# Patient Record
Sex: FEMALE | Race: WHITE | NOT HISPANIC OR LATINO | Employment: OTHER | URBAN - METROPOLITAN AREA
[De-identification: names, ages, dates, MRNs, and addresses within clinical notes are randomized per-mention and may not be internally consistent; named-entity substitution may affect disease eponyms.]

---

## 2017-12-01 ENCOUNTER — CONVERSION ENCOUNTER (OUTPATIENT)
Dept: MAMMOGRAPHY | Facility: HOSPITAL | Age: 67
End: 2017-12-01

## 2018-02-13 ENCOUNTER — OFFICE VISIT CONVERTED (OUTPATIENT)
Dept: CARDIOLOGY | Facility: CLINIC | Age: 68
End: 2018-02-13
Attending: SPECIALIST

## 2018-06-19 ENCOUNTER — OFFICE VISIT CONVERTED (OUTPATIENT)
Dept: FAMILY MEDICINE CLINIC | Facility: CLINIC | Age: 68
End: 2018-06-19
Attending: NURSE PRACTITIONER

## 2018-06-25 ENCOUNTER — OFFICE VISIT CONVERTED (OUTPATIENT)
Dept: SURGERY | Facility: CLINIC | Age: 68
End: 2018-06-25
Attending: SURGERY

## 2018-07-20 ENCOUNTER — OFFICE VISIT CONVERTED (OUTPATIENT)
Dept: SURGERY | Facility: CLINIC | Age: 68
End: 2018-07-20
Attending: SURGERY

## 2018-08-14 ENCOUNTER — OFFICE VISIT CONVERTED (OUTPATIENT)
Dept: CARDIOLOGY | Facility: CLINIC | Age: 68
End: 2018-08-14
Attending: SPECIALIST

## 2018-08-24 ENCOUNTER — CONVERSION ENCOUNTER (OUTPATIENT)
Dept: FAMILY MEDICINE CLINIC | Facility: CLINIC | Age: 68
End: 2018-08-24

## 2018-08-24 ENCOUNTER — OFFICE VISIT CONVERTED (OUTPATIENT)
Dept: FAMILY MEDICINE CLINIC | Facility: CLINIC | Age: 68
End: 2018-08-24
Attending: NURSE PRACTITIONER

## 2018-10-31 ENCOUNTER — CONVERSION ENCOUNTER (OUTPATIENT)
Dept: FAMILY MEDICINE CLINIC | Facility: CLINIC | Age: 68
End: 2018-10-31

## 2018-10-31 ENCOUNTER — OFFICE VISIT CONVERTED (OUTPATIENT)
Dept: FAMILY MEDICINE CLINIC | Facility: CLINIC | Age: 68
End: 2018-10-31
Attending: NURSE PRACTITIONER

## 2018-12-03 ENCOUNTER — CONVERSION ENCOUNTER (OUTPATIENT)
Dept: MAMMOGRAPHY | Facility: HOSPITAL | Age: 68
End: 2018-12-03

## 2018-12-14 ENCOUNTER — OFFICE VISIT CONVERTED (OUTPATIENT)
Dept: SURGERY | Facility: CLINIC | Age: 68
End: 2018-12-14
Attending: SURGERY

## 2019-01-23 ENCOUNTER — HOSPITAL ENCOUNTER (OUTPATIENT)
Dept: PERIOP | Facility: HOSPITAL | Age: 69
Setting detail: HOSPITAL OUTPATIENT SURGERY
Discharge: HOME | End: 2019-01-23
Attending: SURGERY

## 2019-01-23 LAB
ANION GAP SERPL CALC-SCNC: 14 MMOL/L (ref 8–19)
BUN SERPL-MCNC: 12 MG/DL (ref 5–25)
BUN/CREAT SERPL: 11 {RATIO} (ref 6–20)
CALCIUM SERPL-MCNC: 9.4 MG/DL (ref 8.7–10.4)
CHLORIDE SERPL-SCNC: 104 MMOL/L (ref 99–111)
CONV CO2: 27 MMOL/L (ref 22–32)
CREAT UR-MCNC: 1.12 MG/DL (ref 0.5–0.9)
GFR SERPLBLD BASED ON 1.73 SQ M-ARVRAT: 51 ML/MIN/{1.73_M2}
GLUCOSE BLD-MCNC: 88 MG/DL (ref 65–99)
GLUCOSE SERPL-MCNC: 95 MG/DL (ref 65–99)
OSMOLALITY SERPL CALC.SUM OF ELEC: 292 MOSM/KG (ref 273–304)
POTASSIUM SERPL-SCNC: 4.2 MMOL/L (ref 3.5–5.3)
SODIUM SERPL-SCNC: 141 MMOL/L (ref 135–147)

## 2019-01-31 ENCOUNTER — HOSPITAL ENCOUNTER (OUTPATIENT)
Dept: GENERAL RADIOLOGY | Facility: HOSPITAL | Age: 69
Discharge: HOME | End: 2019-01-31
Attending: NURSE PRACTITIONER

## 2019-01-31 ENCOUNTER — OFFICE VISIT CONVERTED (OUTPATIENT)
Dept: FAMILY MEDICINE CLINIC | Facility: CLINIC | Age: 69
End: 2019-01-31
Attending: NURSE PRACTITIONER

## 2019-01-31 LAB
ALBUMIN SERPL-MCNC: 4 G/DL (ref 3.5–5)
ALBUMIN/GLOB SERPL: 1.2 {RATIO} (ref 1.4–2.6)
ALP SERPL-CCNC: 173 U/L (ref 43–160)
ALT SERPL-CCNC: 8 U/L (ref 10–40)
ANION GAP SERPL CALC-SCNC: 15 MMOL/L (ref 8–19)
APPEARANCE UR: CLEAR
AST SERPL-CCNC: 11 U/L (ref 15–50)
BASOPHILS # BLD AUTO: 0.02 10*3/UL (ref 0–0.2)
BASOPHILS NFR BLD AUTO: 0.22 % (ref 0–3)
BILIRUB SERPL-MCNC: 0.4 MG/DL (ref 0.2–1.3)
BILIRUB UR QL: NEGATIVE
BUN SERPL-MCNC: 12 MG/DL (ref 5–25)
BUN/CREAT SERPL: 13 {RATIO} (ref 6–20)
CALCIUM SERPL-MCNC: 10 MG/DL (ref 8.7–10.4)
CHLORIDE SERPL-SCNC: 102 MMOL/L (ref 99–111)
CHOLEST SERPL-MCNC: 158 MG/DL (ref 107–200)
CHOLEST/HDLC SERPL: 3.8 {RATIO} (ref 3–6)
COLOR UR: YELLOW
CONV BACTERIA: NEGATIVE
CONV CO2: 26 MMOL/L (ref 22–32)
CONV COLLECTION SOURCE (UA): ABNORMAL
CONV CREATININE URINE, RANDOM: 88.6 MG/DL (ref 10–300)
CONV MICROALBUM.,U,RANDOM: <12 MG/L (ref 0–20)
CONV TOTAL PROTEIN: 7.3 G/DL (ref 6.3–8.2)
CONV UROBILINOGEN IN URINE BY AUTOMATED TEST STRIP: 1 {EHRLICHU}/DL (ref 0.1–1)
CREAT UR-MCNC: 0.93 MG/DL (ref 0.5–0.9)
EOSINOPHIL # BLD AUTO: 0.24 10*3/UL (ref 0–0.7)
EOSINOPHIL # BLD AUTO: 2.65 % (ref 0–7)
ERYTHROCYTE [DISTWIDTH] IN BLOOD BY AUTOMATED COUNT: 11.6 % (ref 11.5–14.5)
EST. AVERAGE GLUCOSE BLD GHB EST-MCNC: 123 MG/DL
GFR SERPLBLD BASED ON 1.73 SQ M-ARVRAT: >60 ML/MIN/{1.73_M2}
GLOBULIN UR ELPH-MCNC: 3.3 G/DL (ref 2–3.5)
GLUCOSE SERPL-MCNC: 92 MG/DL (ref 65–99)
GLUCOSE UR QL: NEGATIVE MG/DL
HBA1C MFR BLD: 13.5 G/DL (ref 12–16)
HBA1C MFR BLD: 5.9 % (ref 3.5–5.7)
HCT VFR BLD AUTO: 40.1 % (ref 37–47)
HDLC SERPL-MCNC: 42 MG/DL (ref 40–60)
HGB UR QL STRIP: ABNORMAL
KETONES UR QL STRIP: NEGATIVE MG/DL
LDLC SERPL CALC-MCNC: 72 MG/DL (ref 70–100)
LEUKOCYTE ESTERASE UR QL STRIP: ABNORMAL
LYMPHOCYTES # BLD AUTO: 1.63 10*3/UL (ref 1–5)
MCH RBC QN AUTO: 29 PG (ref 27–31)
MCHC RBC AUTO-ENTMCNC: 33.7 G/DL (ref 33–37)
MCV RBC AUTO: 86 FL (ref 81–99)
MICROALBUMIN/CREAT UR: 13.5 MG/G{CRE} (ref 0–35)
MONOCYTES # BLD AUTO: 0.82 10*3/UL (ref 0.2–1.2)
MONOCYTES NFR BLD AUTO: 9.13 % (ref 3–10)
NEUTROPHILS # BLD AUTO: 6.24 10*3/UL (ref 2–8)
NEUTROPHILS NFR BLD AUTO: 69.8 % (ref 30–85)
NITRITE UR QL STRIP: NEGATIVE
NRBC BLD AUTO-RTO: 0 % (ref 0–0.01)
OSMOLALITY SERPL CALC.SUM OF ELEC: 285 MOSM/KG (ref 273–304)
PH UR STRIP.AUTO: 5.5 [PH] (ref 5–8)
PLATELET # BLD AUTO: 266 10*3/UL (ref 130–400)
PMV BLD AUTO: 6.7 FL (ref 7.4–10.4)
POTASSIUM SERPL-SCNC: 4.8 MMOL/L (ref 3.5–5.3)
PROT UR QL: NEGATIVE MG/DL
RBC # BLD AUTO: 4.66 10*6/UL (ref 4.2–5.4)
RBC #/AREA URNS HPF: ABNORMAL /[HPF]
SODIUM SERPL-SCNC: 138 MMOL/L (ref 135–147)
SP GR UR: 1.01 (ref 1–1.03)
T4 FREE SERPL-MCNC: 1.4 NG/DL (ref 0.9–1.8)
TRIGL SERPL-MCNC: 218 MG/DL (ref 40–150)
TSH SERPL-ACNC: 1.21 M[IU]/L (ref 0.27–4.2)
VARIANT LYMPHS NFR BLD MANUAL: 18.2 % (ref 20–45)
VLDLC SERPL-MCNC: 44 MG/DL (ref 5–37)
WBC # BLD AUTO: 8.94 10*3/UL (ref 4.8–10.8)
WBC #/AREA URNS HPF: ABNORMAL /[HPF]

## 2019-02-01 LAB — HCV AB SER DONR QL: <0.1 S/CO RATIO (ref 0–0.9)

## 2019-02-03 LAB
AMOXICILLIN+CLAV SUSC ISLT: <=2
AMPICILLIN SUSC ISLT: <=2
AMPICILLIN+SULBAC SUSC ISLT: <=2
BACTERIA UR CULT: ABNORMAL
CEFAZOLIN SUSC ISLT: <=4
CEFEPIME SUSC ISLT: <=1
CEFTAZIDIME SUSC ISLT: <=1
CEFTRIAXONE SUSC ISLT: <=1
CEFUROXIME ORAL SUSC ISLT: 4
CEFUROXIME PARENTER SUSC ISLT: 4
CIPROFLOXACIN SUSC ISLT: <=0.25
ERTAPENEM SUSC ISLT: <=0.5
GENTAMICIN SUSC ISLT: <=1
LEVOFLOXACIN SUSC ISLT: <=0.12
NITROFURANTOIN SUSC ISLT: <=16
TETRACYCLINE SUSC ISLT: <=1
TMP SMX SUSC ISLT: <=20
TOBRAMYCIN SUSC ISLT: <=1

## 2019-02-05 ENCOUNTER — OFFICE VISIT CONVERTED (OUTPATIENT)
Dept: SURGERY | Facility: CLINIC | Age: 69
End: 2019-02-05
Attending: SURGERY

## 2019-02-26 ENCOUNTER — CONVERSION ENCOUNTER (OUTPATIENT)
Dept: FAMILY MEDICINE CLINIC | Facility: CLINIC | Age: 69
End: 2019-02-26

## 2019-02-26 ENCOUNTER — OFFICE VISIT CONVERTED (OUTPATIENT)
Dept: FAMILY MEDICINE CLINIC | Facility: CLINIC | Age: 69
End: 2019-02-26
Attending: NURSE PRACTITIONER

## 2019-03-19 ENCOUNTER — OFFICE VISIT CONVERTED (OUTPATIENT)
Dept: CARDIOLOGY | Facility: CLINIC | Age: 69
End: 2019-03-19
Attending: SPECIALIST

## 2019-03-19 ENCOUNTER — CONVERSION ENCOUNTER (OUTPATIENT)
Dept: CARDIOLOGY | Facility: CLINIC | Age: 69
End: 2019-03-19

## 2019-03-21 ENCOUNTER — HOSPITAL ENCOUNTER (OUTPATIENT)
Dept: CARDIOLOGY | Facility: HOSPITAL | Age: 69
Discharge: HOME | End: 2019-03-21
Attending: NURSE PRACTITIONER

## 2019-03-27 ENCOUNTER — OFFICE VISIT CONVERTED (OUTPATIENT)
Dept: PODIATRY | Facility: CLINIC | Age: 69
End: 2019-03-27
Attending: PODIATRIST

## 2019-05-10 ENCOUNTER — OFFICE VISIT CONVERTED (OUTPATIENT)
Dept: ORTHOPEDIC SURGERY | Facility: CLINIC | Age: 69
End: 2019-05-10
Attending: ORTHOPAEDIC SURGERY

## 2019-06-06 ENCOUNTER — OFFICE VISIT CONVERTED (OUTPATIENT)
Dept: ORTHOPEDIC SURGERY | Facility: CLINIC | Age: 69
End: 2019-06-06
Attending: ORTHOPAEDIC SURGERY

## 2019-06-18 ENCOUNTER — HOSPITAL ENCOUNTER (OUTPATIENT)
Dept: FAMILY MEDICINE CLINIC | Facility: CLINIC | Age: 69
Discharge: HOME | End: 2019-06-18
Attending: NURSE PRACTITIONER

## 2019-06-18 ENCOUNTER — HOSPITAL ENCOUNTER (OUTPATIENT)
Dept: GENERAL RADIOLOGY | Facility: HOSPITAL | Age: 69
Discharge: HOME | End: 2019-06-18
Attending: NURSE PRACTITIONER

## 2019-06-18 ENCOUNTER — OFFICE VISIT CONVERTED (OUTPATIENT)
Dept: FAMILY MEDICINE CLINIC | Facility: CLINIC | Age: 69
End: 2019-06-18
Attending: NURSE PRACTITIONER

## 2019-06-18 LAB
ALBUMIN SERPL-MCNC: 4.3 G/DL (ref 3.5–5)
ALBUMIN/GLOB SERPL: 1.4 {RATIO} (ref 1.4–2.6)
ALP SERPL-CCNC: 129 U/L (ref 43–160)
ALT SERPL-CCNC: 14 U/L (ref 10–40)
ANION GAP SERPL CALC-SCNC: 16 MMOL/L (ref 8–19)
AST SERPL-CCNC: 16 U/L (ref 15–50)
BASOPHILS # BLD AUTO: 0.05 10*3/UL (ref 0–0.2)
BASOPHILS NFR BLD AUTO: 0.5 % (ref 0–3)
BILIRUB SERPL-MCNC: 0.45 MG/DL (ref 0.2–1.3)
BUN SERPL-MCNC: 13 MG/DL (ref 5–25)
BUN/CREAT SERPL: 13 {RATIO} (ref 6–20)
CALCIUM SERPL-MCNC: 9.4 MG/DL (ref 8.7–10.4)
CHLORIDE SERPL-SCNC: 102 MMOL/L (ref 99–111)
CONV ABS IMM GRAN: 0.05 10*3/UL (ref 0–0.2)
CONV CO2: 25 MMOL/L (ref 22–32)
CONV IMMATURE GRAN: 0.5 % (ref 0–1.8)
CONV TOTAL PROTEIN: 7.3 G/DL (ref 6.3–8.2)
CREAT UR-MCNC: 0.99 MG/DL (ref 0.5–0.9)
DEPRECATED RDW RBC AUTO: 42.3 FL (ref 36.4–46.3)
EOSINOPHIL # BLD AUTO: 0.07 10*3/UL (ref 0–0.7)
EOSINOPHIL # BLD AUTO: 0.7 % (ref 0–7)
ERYTHROCYTE [DISTWIDTH] IN BLOOD BY AUTOMATED COUNT: 12.9 % (ref 11.7–14.4)
FOLATE SERPL-MCNC: 15.2 NG/ML (ref 4.8–20)
GFR SERPLBLD BASED ON 1.73 SQ M-ARVRAT: 59 ML/MIN/{1.73_M2}
GLOBULIN UR ELPH-MCNC: 3 G/DL (ref 2–3.5)
GLUCOSE SERPL-MCNC: 100 MG/DL (ref 65–99)
HBA1C MFR BLD: 13.8 G/DL (ref 12–16)
HCT VFR BLD AUTO: 43.7 % (ref 37–47)
LYMPHOCYTES # BLD AUTO: 1.54 10*3/UL (ref 1–5)
MCH RBC QN AUTO: 28.5 PG (ref 27–31)
MCHC RBC AUTO-ENTMCNC: 31.6 G/DL (ref 33–37)
MCV RBC AUTO: 90.3 FL (ref 81–99)
MONOCYTES # BLD AUTO: 0.88 10*3/UL (ref 0.2–1.2)
MONOCYTES NFR BLD AUTO: 8.4 % (ref 3–10)
NEUTROPHILS # BLD AUTO: 7.84 10*3/UL (ref 2–8)
NEUTROPHILS NFR BLD AUTO: 75.1 % (ref 30–85)
NRBC CBCN: 0 % (ref 0–0.7)
OSMOLALITY SERPL CALC.SUM OF ELEC: 288 MOSM/KG (ref 273–304)
PLATELET # BLD AUTO: 258 10*3/UL (ref 130–400)
PMV BLD AUTO: 10.2 FL (ref 9.4–12.3)
POTASSIUM SERPL-SCNC: 4.3 MMOL/L (ref 3.5–5.3)
RBC # BLD AUTO: 4.84 10*6/UL (ref 4.2–5.4)
SODIUM SERPL-SCNC: 139 MMOL/L (ref 135–147)
T4 FREE SERPL-MCNC: 1.5 NG/DL (ref 0.9–1.8)
TSH SERPL-ACNC: 1.03 M[IU]/L (ref 0.27–4.2)
VARIANT LYMPHS NFR BLD MANUAL: 14.8 % (ref 20–45)
VIT B12 SERPL-MCNC: 326 PG/ML (ref 211–911)
WBC # BLD AUTO: 10.43 10*3/UL (ref 4.8–10.8)

## 2019-06-20 LAB — B BURGDOR IGG+IGM SER-ACNC: <0.91 ISR (ref 0–0.9)

## 2019-07-18 ENCOUNTER — OFFICE VISIT CONVERTED (OUTPATIENT)
Dept: ORTHOPEDIC SURGERY | Facility: CLINIC | Age: 69
End: 2019-07-18
Attending: ORTHOPAEDIC SURGERY

## 2019-08-07 ENCOUNTER — OFFICE VISIT CONVERTED (OUTPATIENT)
Dept: FAMILY MEDICINE CLINIC | Facility: CLINIC | Age: 69
End: 2019-08-07
Attending: NURSE PRACTITIONER

## 2019-08-15 ENCOUNTER — HOSPITAL ENCOUNTER (OUTPATIENT)
Dept: OTHER | Facility: HOSPITAL | Age: 69
Discharge: HOME | End: 2019-08-15
Attending: NURSE PRACTITIONER

## 2019-08-15 LAB
CREAT BLD-MCNC: 0.9 MG/DL (ref 0.6–1.4)
GFR SERPLBLD BASED ON 1.73 SQ M-ARVRAT: >60 ML/MIN/{1.73_M2}

## 2019-08-20 ENCOUNTER — CONVERSION ENCOUNTER (OUTPATIENT)
Dept: SURGERY | Facility: CLINIC | Age: 69
End: 2019-08-20

## 2019-08-20 ENCOUNTER — OFFICE VISIT CONVERTED (OUTPATIENT)
Dept: SURGERY | Facility: CLINIC | Age: 69
End: 2019-08-20
Attending: SURGERY

## 2019-08-27 ENCOUNTER — HOSPITAL ENCOUNTER (OUTPATIENT)
Dept: PREADMISSION TESTING | Facility: HOSPITAL | Age: 69
Discharge: HOME | End: 2019-08-27
Attending: SURGERY

## 2019-08-27 LAB
ANION GAP SERPL CALC-SCNC: 16 MMOL/L (ref 8–19)
BASOPHILS # BLD AUTO: 0.05 10*3/UL (ref 0–0.2)
BASOPHILS NFR BLD AUTO: 0.5 % (ref 0–3)
BUN SERPL-MCNC: 11 MG/DL (ref 5–25)
BUN/CREAT SERPL: 12 {RATIO} (ref 6–20)
CALCIUM SERPL-MCNC: 10 MG/DL (ref 8.7–10.4)
CHLORIDE SERPL-SCNC: 100 MMOL/L (ref 99–111)
CONV ABS IMM GRAN: 0.08 10*3/UL (ref 0–0.2)
CONV CO2: 27 MMOL/L (ref 22–32)
CONV IMMATURE GRAN: 0.8 % (ref 0–1.8)
CREAT UR-MCNC: 0.95 MG/DL (ref 0.5–0.9)
DEPRECATED RDW RBC AUTO: 42.8 FL (ref 36.4–46.3)
EOSINOPHIL # BLD AUTO: 0.13 10*3/UL (ref 0–0.7)
EOSINOPHIL # BLD AUTO: 1.3 % (ref 0–7)
ERYTHROCYTE [DISTWIDTH] IN BLOOD BY AUTOMATED COUNT: 13.2 % (ref 11.7–14.4)
GFR SERPLBLD BASED ON 1.73 SQ M-ARVRAT: >60 ML/MIN/{1.73_M2}
GLUCOSE SERPL-MCNC: 94 MG/DL (ref 65–99)
HCT VFR BLD AUTO: 41.9 % (ref 37–47)
HGB BLD-MCNC: 13.3 G/DL (ref 12–16)
LYMPHOCYTES # BLD AUTO: 1.77 10*3/UL (ref 1–5)
LYMPHOCYTES NFR BLD AUTO: 17.9 % (ref 20–45)
MCH RBC QN AUTO: 28.4 PG (ref 27–31)
MCHC RBC AUTO-ENTMCNC: 31.7 G/DL (ref 33–37)
MCV RBC AUTO: 89.3 FL (ref 81–99)
MONOCYTES # BLD AUTO: 0.85 10*3/UL (ref 0.2–1.2)
MONOCYTES NFR BLD AUTO: 8.6 % (ref 3–10)
NEUTROPHILS # BLD AUTO: 7.02 10*3/UL (ref 2–8)
NEUTROPHILS NFR BLD AUTO: 70.9 % (ref 30–85)
NRBC CBCN: 0 % (ref 0–0.7)
OSMOLALITY SERPL CALC.SUM OF ELEC: 285 MOSM/KG (ref 273–304)
PLATELET # BLD AUTO: 243 10*3/UL (ref 130–400)
PMV BLD AUTO: 9.4 FL (ref 9.4–12.3)
POTASSIUM SERPL-SCNC: 4.5 MMOL/L (ref 3.5–5.3)
RBC # BLD AUTO: 4.69 10*6/UL (ref 4.2–5.4)
SODIUM SERPL-SCNC: 138 MMOL/L (ref 135–147)
WBC # BLD AUTO: 9.9 10*3/UL (ref 4.8–10.8)

## 2019-09-04 ENCOUNTER — HOSPITAL ENCOUNTER (OUTPATIENT)
Dept: PERIOP | Facility: HOSPITAL | Age: 69
Setting detail: HOSPITAL OUTPATIENT SURGERY
Discharge: HOME | End: 2019-09-04
Attending: SURGERY

## 2019-09-04 LAB
GLUCOSE BLD-MCNC: 109 MG/DL (ref 65–99)
GLUCOSE BLD-MCNC: 89 MG/DL (ref 65–99)

## 2019-09-16 ENCOUNTER — OFFICE VISIT CONVERTED (OUTPATIENT)
Dept: SURGERY | Facility: CLINIC | Age: 69
End: 2019-09-16
Attending: SURGERY

## 2019-09-24 ENCOUNTER — OFFICE VISIT CONVERTED (OUTPATIENT)
Dept: CARDIOLOGY | Facility: CLINIC | Age: 69
End: 2019-09-24
Attending: SPECIALIST

## 2020-01-23 ENCOUNTER — CONVERSION ENCOUNTER (OUTPATIENT)
Dept: FAMILY MEDICINE CLINIC | Facility: CLINIC | Age: 70
End: 2020-01-23

## 2020-01-23 ENCOUNTER — OFFICE VISIT CONVERTED (OUTPATIENT)
Dept: FAMILY MEDICINE CLINIC | Facility: CLINIC | Age: 70
End: 2020-01-23
Attending: NURSE PRACTITIONER

## 2020-01-23 ENCOUNTER — HOSPITAL ENCOUNTER (OUTPATIENT)
Dept: FAMILY MEDICINE CLINIC | Facility: CLINIC | Age: 70
Discharge: HOME | End: 2020-01-23
Attending: NURSE PRACTITIONER

## 2020-01-23 ENCOUNTER — HOSPITAL ENCOUNTER (OUTPATIENT)
Dept: OTHER | Facility: HOSPITAL | Age: 70
Discharge: HOME | End: 2020-01-23
Attending: NURSE PRACTITIONER

## 2020-01-23 LAB
25(OH)D3 SERPL-MCNC: 65.9 NG/ML (ref 30–100)
ALBUMIN SERPL-MCNC: 4.3 G/DL (ref 3.5–5)
ALBUMIN/GLOB SERPL: 1.5 {RATIO} (ref 1.4–2.6)
ALP SERPL-CCNC: 112 U/L (ref 43–160)
ALT SERPL-CCNC: 16 U/L (ref 10–40)
ANION GAP SERPL CALC-SCNC: 17 MMOL/L (ref 8–19)
APPEARANCE UR: CLEAR
AST SERPL-CCNC: 18 U/L (ref 15–50)
BASOPHILS # BLD AUTO: 0.05 10*3/UL (ref 0–0.2)
BASOPHILS NFR BLD AUTO: 0.6 % (ref 0–3)
BILIRUB SERPL-MCNC: 0.36 MG/DL (ref 0.2–1.3)
BILIRUB UR QL: NEGATIVE
BUN SERPL-MCNC: 13 MG/DL (ref 5–25)
BUN/CREAT SERPL: 13 {RATIO} (ref 6–20)
CALCIUM SERPL-MCNC: 9.6 MG/DL (ref 8.7–10.4)
CHLORIDE SERPL-SCNC: 99 MMOL/L (ref 99–111)
CHOLEST SERPL-MCNC: 148 MG/DL (ref 107–200)
CHOLEST/HDLC SERPL: 3.8 {RATIO} (ref 3–6)
COLOR UR: YELLOW
CONV ABS IMM GRAN: 0.04 10*3/UL (ref 0–0.2)
CONV BACTERIA: NEGATIVE
CONV CO2: 25 MMOL/L (ref 22–32)
CONV COLLECTION SOURCE (UA): ABNORMAL
CONV CREATININE URINE, RANDOM: 78.2 MG/DL (ref 10–300)
CONV IMMATURE GRAN: 0.5 % (ref 0–1.8)
CONV MICROALBUM.,U,RANDOM: <12 MG/L (ref 0–20)
CONV TOTAL PROTEIN: 7.1 G/DL (ref 6.3–8.2)
CONV UROBILINOGEN IN URINE BY AUTOMATED TEST STRIP: 1 {EHRLICHU}/DL (ref 0.1–1)
CREAT UR-MCNC: 1 MG/DL (ref 0.5–0.9)
DEPRECATED RDW RBC AUTO: 42 FL (ref 36.4–46.3)
EOSINOPHIL # BLD AUTO: 0.13 10*3/UL (ref 0–0.7)
EOSINOPHIL # BLD AUTO: 1.7 % (ref 0–7)
ERYTHROCYTE [DISTWIDTH] IN BLOOD BY AUTOMATED COUNT: 12.6 % (ref 11.7–14.4)
EST. AVERAGE GLUCOSE BLD GHB EST-MCNC: 120 MG/DL
GFR SERPLBLD BASED ON 1.73 SQ M-ARVRAT: 58 ML/MIN/{1.73_M2}
GLOBULIN UR ELPH-MCNC: 2.8 G/DL (ref 2–3.5)
GLUCOSE SERPL-MCNC: 92 MG/DL (ref 65–99)
GLUCOSE UR QL: NEGATIVE MG/DL
HBA1C MFR BLD: 5.8 % (ref 3.5–5.7)
HCT VFR BLD AUTO: 42.7 % (ref 37–47)
HDLC SERPL-MCNC: 39 MG/DL (ref 40–60)
HGB BLD-MCNC: 13.3 G/DL (ref 12–16)
HGB UR QL STRIP: ABNORMAL
KETONES UR QL STRIP: NEGATIVE MG/DL
LDLC SERPL CALC-MCNC: 71 MG/DL (ref 70–100)
LEUKOCYTE ESTERASE UR QL STRIP: ABNORMAL
LYMPHOCYTES # BLD AUTO: 1.38 10*3/UL (ref 1–5)
LYMPHOCYTES NFR BLD AUTO: 17.6 % (ref 20–45)
MCH RBC QN AUTO: 28.2 PG (ref 27–31)
MCHC RBC AUTO-ENTMCNC: 31.1 G/DL (ref 33–37)
MCV RBC AUTO: 90.7 FL (ref 81–99)
MICROALBUMIN/CREAT UR: 15.3 MG/G{CRE} (ref 0–35)
MONOCYTES # BLD AUTO: 0.71 10*3/UL (ref 0.2–1.2)
MONOCYTES NFR BLD AUTO: 9.1 % (ref 3–10)
NEUTROPHILS # BLD AUTO: 5.52 10*3/UL (ref 2–8)
NEUTROPHILS NFR BLD AUTO: 70.5 % (ref 30–85)
NITRITE UR QL STRIP: NEGATIVE
NRBC CBCN: 0 % (ref 0–0.7)
OSMOLALITY SERPL CALC.SUM OF ELEC: 284 MOSM/KG (ref 273–304)
PH UR STRIP.AUTO: 6.5 [PH] (ref 5–8)
PLATELET # BLD AUTO: 217 10*3/UL (ref 130–400)
PMV BLD AUTO: 9.9 FL (ref 9.4–12.3)
POTASSIUM SERPL-SCNC: 4.4 MMOL/L (ref 3.5–5.3)
PROT UR QL: NEGATIVE MG/DL
RBC # BLD AUTO: 4.71 10*6/UL (ref 4.2–5.4)
RBC #/AREA URNS HPF: ABNORMAL /[HPF]
SODIUM SERPL-SCNC: 137 MMOL/L (ref 135–147)
SP GR UR: 1.01 (ref 1–1.03)
T4 FREE SERPL-MCNC: 1.5 NG/DL (ref 0.9–1.8)
TRIGL SERPL-MCNC: 192 MG/DL (ref 40–150)
TSH SERPL-ACNC: 1.84 M[IU]/L (ref 0.27–4.2)
VLDLC SERPL-MCNC: 38 MG/DL (ref 5–37)
WBC # BLD AUTO: 7.83 10*3/UL (ref 4.8–10.8)
WBC #/AREA URNS HPF: ABNORMAL /[HPF]

## 2020-01-26 LAB — BACTERIA UR CULT: NORMAL

## 2020-01-27 ENCOUNTER — HOSPITAL ENCOUNTER (OUTPATIENT)
Dept: FAMILY MEDICINE CLINIC | Facility: CLINIC | Age: 70
Discharge: HOME | End: 2020-01-27
Attending: NURSE PRACTITIONER

## 2020-01-29 LAB — BACTERIA UR CULT: NORMAL

## 2020-03-11 ENCOUNTER — HOSPITAL ENCOUNTER (OUTPATIENT)
Dept: MAMMOGRAPHY | Facility: HOSPITAL | Age: 70
Discharge: HOME | End: 2020-03-11
Attending: NURSE PRACTITIONER

## 2020-05-01 ENCOUNTER — TELEMEDICINE CONVERTED (OUTPATIENT)
Dept: PLASTIC SURGERY | Facility: CLINIC | Age: 70
End: 2020-05-01
Attending: PLASTIC SURGERY

## 2020-06-09 ENCOUNTER — OFFICE VISIT CONVERTED (OUTPATIENT)
Dept: FAMILY MEDICINE CLINIC | Facility: CLINIC | Age: 70
End: 2020-06-09
Attending: NURSE PRACTITIONER

## 2020-06-09 ENCOUNTER — HOSPITAL ENCOUNTER (OUTPATIENT)
Dept: FAMILY MEDICINE CLINIC | Facility: CLINIC | Age: 70
Discharge: HOME | End: 2020-06-09
Attending: NURSE PRACTITIONER

## 2020-06-09 ENCOUNTER — CONVERSION ENCOUNTER (OUTPATIENT)
Dept: FAMILY MEDICINE CLINIC | Facility: CLINIC | Age: 70
End: 2020-06-09

## 2020-06-09 LAB
BASOPHILS # BLD AUTO: 0.05 10*3/UL (ref 0–0.2)
BASOPHILS NFR BLD AUTO: 0.6 % (ref 0–3)
CONV ABS IMM GRAN: 0.08 10*3/UL (ref 0–0.2)
CONV IMMATURE GRAN: 0.9 % (ref 0–1.8)
DEPRECATED RDW RBC AUTO: 42.1 FL (ref 36.4–46.3)
EOSINOPHIL # BLD AUTO: 0.15 10*3/UL (ref 0–0.7)
EOSINOPHIL # BLD AUTO: 1.7 % (ref 0–7)
ERYTHROCYTE [DISTWIDTH] IN BLOOD BY AUTOMATED COUNT: 12.7 % (ref 11.7–14.4)
HCT VFR BLD AUTO: 44 % (ref 37–47)
HGB BLD-MCNC: 13.6 G/DL (ref 12–16)
LYMPHOCYTES # BLD AUTO: 1.78 10*3/UL (ref 1–5)
LYMPHOCYTES NFR BLD AUTO: 20.4 % (ref 20–45)
MCH RBC QN AUTO: 28 PG (ref 27–31)
MCHC RBC AUTO-ENTMCNC: 30.9 G/DL (ref 33–37)
MCV RBC AUTO: 90.7 FL (ref 81–99)
MONOCYTES # BLD AUTO: 0.77 10*3/UL (ref 0.2–1.2)
MONOCYTES NFR BLD AUTO: 8.8 % (ref 3–10)
NEUTROPHILS # BLD AUTO: 5.9 10*3/UL (ref 2–8)
NEUTROPHILS NFR BLD AUTO: 67.6 % (ref 30–85)
NRBC CBCN: 0 % (ref 0–0.7)
PLATELET # BLD AUTO: 234 10*3/UL (ref 130–400)
PMV BLD AUTO: 10 FL (ref 9.4–12.3)
RBC # BLD AUTO: 4.85 10*6/UL (ref 4.2–5.4)
T4 FREE SERPL-MCNC: 1.6 NG/DL (ref 0.9–1.8)
TSH SERPL-ACNC: 2.8 M[IU]/L (ref 0.27–4.2)
WBC # BLD AUTO: 8.73 10*3/UL (ref 4.8–10.8)

## 2020-06-10 LAB
ALBUMIN SERPL-MCNC: 4.3 G/DL (ref 3.5–5)
ALBUMIN/GLOB SERPL: 1.4 {RATIO} (ref 1.4–2.6)
ALP SERPL-CCNC: 110 U/L (ref 43–160)
ALT SERPL-CCNC: 10 U/L (ref 10–40)
ANION GAP SERPL CALC-SCNC: 18 MMOL/L (ref 8–19)
AST SERPL-CCNC: 14 U/L (ref 15–50)
BILIRUB SERPL-MCNC: 0.21 MG/DL (ref 0.2–1.3)
BUN SERPL-MCNC: 9 MG/DL (ref 5–25)
BUN/CREAT SERPL: 9 {RATIO} (ref 6–20)
CALCIUM SERPL-MCNC: 10.1 MG/DL (ref 8.7–10.4)
CHLORIDE SERPL-SCNC: 102 MMOL/L (ref 99–111)
CHOLEST SERPL-MCNC: 139 MG/DL (ref 107–200)
CHOLEST/HDLC SERPL: 3.3 {RATIO} (ref 3–6)
CONV CO2: 23 MMOL/L (ref 22–32)
CONV TOTAL PROTEIN: 7.4 G/DL (ref 6.3–8.2)
CREAT UR-MCNC: 1.05 MG/DL (ref 0.5–0.9)
EST. AVERAGE GLUCOSE BLD GHB EST-MCNC: 126 MG/DL
GFR SERPLBLD BASED ON 1.73 SQ M-ARVRAT: 54 ML/MIN/{1.73_M2}
GLOBULIN UR ELPH-MCNC: 3.1 G/DL (ref 2–3.5)
GLUCOSE SERPL-MCNC: 84 MG/DL (ref 65–99)
HBA1C MFR BLD: 6 % (ref 3.5–5.7)
HDLC SERPL-MCNC: 42 MG/DL (ref 40–60)
LDLC SERPL CALC-MCNC: 45 MG/DL (ref 70–100)
OSMOLALITY SERPL CALC.SUM OF ELEC: 286 MOSM/KG (ref 273–304)
POTASSIUM SERPL-SCNC: 4 MMOL/L (ref 3.5–5.3)
SODIUM SERPL-SCNC: 139 MMOL/L (ref 135–147)
TRIGL SERPL-MCNC: 260 MG/DL (ref 40–150)
VLDLC SERPL-MCNC: 52 MG/DL (ref 5–37)

## 2020-06-17 ENCOUNTER — OFFICE VISIT CONVERTED (OUTPATIENT)
Dept: PODIATRY | Facility: CLINIC | Age: 70
End: 2020-06-17
Attending: PODIATRIST

## 2020-07-21 ENCOUNTER — OFFICE VISIT CONVERTED (OUTPATIENT)
Dept: ORTHOPEDIC SURGERY | Facility: CLINIC | Age: 70
End: 2020-07-21
Attending: ORTHOPAEDIC SURGERY

## 2020-07-21 ENCOUNTER — CONVERSION ENCOUNTER (OUTPATIENT)
Dept: ORTHOPEDIC SURGERY | Facility: CLINIC | Age: 70
End: 2020-07-21

## 2020-07-28 ENCOUNTER — OFFICE VISIT CONVERTED (OUTPATIENT)
Dept: CARDIOLOGY | Facility: CLINIC | Age: 70
End: 2020-07-28
Attending: SPECIALIST

## 2020-08-03 ENCOUNTER — CONVERSION ENCOUNTER (OUTPATIENT)
Dept: PLASTIC SURGERY | Facility: CLINIC | Age: 70
End: 2020-08-03

## 2020-08-03 ENCOUNTER — OFFICE VISIT CONVERTED (OUTPATIENT)
Dept: PLASTIC SURGERY | Facility: CLINIC | Age: 70
End: 2020-08-03
Attending: PLASTIC SURGERY

## 2020-08-13 ENCOUNTER — OFFICE VISIT CONVERTED (OUTPATIENT)
Dept: FAMILY MEDICINE CLINIC | Facility: CLINIC | Age: 70
End: 2020-08-13
Attending: NURSE PRACTITIONER

## 2020-08-13 ENCOUNTER — HOSPITAL ENCOUNTER (OUTPATIENT)
Dept: PREADMISSION TESTING | Facility: HOSPITAL | Age: 70
Discharge: HOME | End: 2020-08-13
Attending: PLASTIC SURGERY

## 2020-08-14 LAB — SARS-COV-2 RNA SPEC QL NAA+PROBE: NOT DETECTED

## 2020-08-18 ENCOUNTER — PROCEDURE VISIT CONVERTED (OUTPATIENT)
Dept: OTHER | Facility: HOSPITAL | Age: 70
End: 2020-08-18
Attending: PLASTIC SURGERY

## 2020-08-18 ENCOUNTER — HOSPITAL ENCOUNTER (OUTPATIENT)
Dept: PERIOP | Facility: HOSPITAL | Age: 70
Setting detail: HOSPITAL OUTPATIENT SURGERY
Discharge: HOME | End: 2020-08-18
Attending: PLASTIC SURGERY

## 2020-08-18 LAB
GLUCOSE BLD-MCNC: 83 MG/DL (ref 65–99)
GLUCOSE BLD-MCNC: 99 MG/DL (ref 65–99)

## 2020-08-27 ENCOUNTER — CONVERSION ENCOUNTER (OUTPATIENT)
Dept: PLASTIC SURGERY | Facility: CLINIC | Age: 70
End: 2020-08-27

## 2020-08-27 ENCOUNTER — OFFICE VISIT CONVERTED (OUTPATIENT)
Dept: PLASTIC SURGERY | Facility: CLINIC | Age: 70
End: 2020-08-27
Attending: PLASTIC SURGERY

## 2020-09-03 ENCOUNTER — HOSPITAL ENCOUNTER (OUTPATIENT)
Dept: ULTRASOUND IMAGING | Facility: HOSPITAL | Age: 70
Discharge: HOME | End: 2020-09-03
Attending: NURSE PRACTITIONER

## 2020-09-03 ENCOUNTER — OFFICE VISIT CONVERTED (OUTPATIENT)
Dept: PLASTIC SURGERY | Facility: CLINIC | Age: 70
End: 2020-09-03
Attending: NURSE PRACTITIONER

## 2020-09-16 ENCOUNTER — HOSPITAL ENCOUNTER (OUTPATIENT)
Dept: OTHER | Facility: HOSPITAL | Age: 70
Discharge: HOME | End: 2020-09-16
Attending: NURSE PRACTITIONER

## 2020-09-16 LAB
ALBUMIN SERPL-MCNC: 4.1 G/DL (ref 3.5–5)
ANION GAP SERPL CALC-SCNC: 16 MMOL/L (ref 8–19)
APPEARANCE UR: CLEAR
BASOPHILS # BLD AUTO: 0.04 10*3/UL (ref 0–0.2)
BASOPHILS NFR BLD AUTO: 0.5 % (ref 0–3)
BILIRUB UR QL: NEGATIVE
BUN SERPL-MCNC: 9 MG/DL (ref 5–25)
BUN/CREAT SERPL: 8 {RATIO} (ref 6–20)
CALCIUM SERPL-MCNC: 10.1 MG/DL (ref 8.7–10.4)
CHLORIDE SERPL-SCNC: 101 MMOL/L (ref 99–111)
COLOR UR: YELLOW
CONV ABS IMM GRAN: 0.04 10*3/UL (ref 0–0.2)
CONV BACTERIA: NEGATIVE
CONV CO2: 26 MMOL/L (ref 22–32)
CONV COLLECTION SOURCE (UA): ABNORMAL
CONV CREATININE URINE, RANDOM: 137.9 MG/DL (ref 10–300)
CONV IMMATURE GRAN: 0.5 % (ref 0–1.8)
CONV PROTEIN TO CREATININE RATIO (RANDOM URINE): 0.04 {RATIO} (ref 0–0.1)
CONV UROBILINOGEN IN URINE BY AUTOMATED TEST STRIP: 1 {EHRLICHU}/DL (ref 0.1–1)
CREAT UR-MCNC: 1.07 MG/DL (ref 0.5–0.9)
DEPRECATED RDW RBC AUTO: 42.3 FL (ref 36.4–46.3)
EOSINOPHIL # BLD AUTO: 0.15 10*3/UL (ref 0–0.7)
EOSINOPHIL # BLD AUTO: 2.1 % (ref 0–7)
ERYTHROCYTE [DISTWIDTH] IN BLOOD BY AUTOMATED COUNT: 12.5 % (ref 11.7–14.4)
GFR SERPLBLD BASED ON 1.73 SQ M-ARVRAT: 53 ML/MIN/{1.73_M2}
GLUCOSE SERPL-MCNC: 135 MG/DL (ref 65–99)
GLUCOSE UR QL: NEGATIVE MG/DL
HCT VFR BLD AUTO: 42.9 % (ref 37–47)
HGB BLD-MCNC: 13.2 G/DL (ref 12–16)
HGB UR QL STRIP: ABNORMAL
KETONES UR QL STRIP: NEGATIVE MG/DL
LEUKOCYTE ESTERASE UR QL STRIP: ABNORMAL
LYMPHOCYTES # BLD AUTO: 1.63 10*3/UL (ref 1–5)
LYMPHOCYTES NFR BLD AUTO: 22.3 % (ref 20–45)
MCH RBC QN AUTO: 28.4 PG (ref 27–31)
MCHC RBC AUTO-ENTMCNC: 30.8 G/DL (ref 33–37)
MCV RBC AUTO: 92.5 FL (ref 81–99)
MONOCYTES # BLD AUTO: 0.49 10*3/UL (ref 0.2–1.2)
MONOCYTES NFR BLD AUTO: 6.7 % (ref 3–10)
NEUTROPHILS # BLD AUTO: 4.95 10*3/UL (ref 2–8)
NEUTROPHILS NFR BLD AUTO: 67.9 % (ref 30–85)
NITRITE UR QL STRIP: NEGATIVE
NRBC CBCN: 0 % (ref 0–0.7)
PH UR STRIP.AUTO: 6.5 [PH] (ref 5–8)
PHOSPHATE SERPL-MCNC: 3.7 MG/DL (ref 2.4–4.5)
PLATELET # BLD AUTO: 240 10*3/UL (ref 130–400)
PMV BLD AUTO: 9.6 FL (ref 9.4–12.3)
POTASSIUM SERPL-SCNC: 3.8 MMOL/L (ref 3.5–5.3)
PROT UR QL: NEGATIVE MG/DL
PROT UR-MCNC: 5.8 MG/DL
RBC # BLD AUTO: 4.64 10*6/UL (ref 4.2–5.4)
RBC #/AREA URNS HPF: ABNORMAL /[HPF]
SODIUM SERPL-SCNC: 139 MMOL/L (ref 135–147)
SP GR UR: 1.02 (ref 1–1.03)
SQUAMOUS SPT QL MICRO: ABNORMAL /[HPF]
WBC # BLD AUTO: 7.3 10*3/UL (ref 4.8–10.8)
WBC #/AREA URNS HPF: ABNORMAL /[HPF]

## 2020-10-07 ENCOUNTER — OFFICE VISIT CONVERTED (OUTPATIENT)
Dept: FAMILY MEDICINE CLINIC | Facility: CLINIC | Age: 70
End: 2020-10-07
Attending: NURSE PRACTITIONER

## 2020-11-18 ENCOUNTER — HOSPITAL ENCOUNTER (OUTPATIENT)
Dept: OTHER | Facility: HOSPITAL | Age: 70
Discharge: HOME | End: 2020-11-18
Attending: NURSE PRACTITIONER

## 2020-11-18 LAB
ALBUMIN SERPL-MCNC: 4.1 G/DL (ref 3.5–5)
ANION GAP SERPL CALC-SCNC: 14 MMOL/L (ref 8–19)
APPEARANCE UR: CLEAR
BASOPHILS # BLD AUTO: 0.05 10*3/UL (ref 0–0.2)
BASOPHILS NFR BLD AUTO: 0.6 % (ref 0–3)
BILIRUB UR QL: NEGATIVE
BUN SERPL-MCNC: 9 MG/DL (ref 5–25)
BUN/CREAT SERPL: 9 {RATIO} (ref 6–20)
CALCIUM SERPL-MCNC: 10.2 MG/DL (ref 8.7–10.4)
CHLORIDE SERPL-SCNC: 101 MMOL/L (ref 99–111)
COLOR UR: YELLOW
CONV ABS IMM GRAN: 0.04 10*3/UL (ref 0–0.2)
CONV BACTERIA: NEGATIVE
CONV CO2: 28 MMOL/L (ref 22–32)
CONV COLLECTION SOURCE (UA): ABNORMAL
CONV CREATININE URINE, RANDOM: 207.9 MG/DL (ref 10–300)
CONV HYALINE CASTS IN URINE MICRO: ABNORMAL /[LPF]
CONV IMMATURE GRAN: 0.5 % (ref 0–1.8)
CONV UROBILINOGEN IN URINE BY AUTOMATED TEST STRIP: 1 {EHRLICHU}/DL (ref 0.1–1)
CREAT UR-MCNC: 1.02 MG/DL (ref 0.5–0.9)
DEPRECATED RDW RBC AUTO: 40.6 FL (ref 36.4–46.3)
EOSINOPHIL # BLD AUTO: 0.17 10*3/UL (ref 0–0.7)
EOSINOPHIL # BLD AUTO: 2.2 % (ref 0–7)
ERYTHROCYTE [DISTWIDTH] IN BLOOD BY AUTOMATED COUNT: 12.4 % (ref 11.7–14.4)
GFR SERPLBLD BASED ON 1.73 SQ M-ARVRAT: 56 ML/MIN/{1.73_M2}
GLUCOSE SERPL-MCNC: 91 MG/DL (ref 65–99)
GLUCOSE UR QL: NEGATIVE MG/DL
HCT VFR BLD AUTO: 43 % (ref 37–47)
HGB BLD-MCNC: 13.7 G/DL (ref 12–16)
HGB UR QL STRIP: ABNORMAL
KETONES UR QL STRIP: NEGATIVE MG/DL
LEUKOCYTE ESTERASE UR QL STRIP: NEGATIVE
LYMPHOCYTES # BLD AUTO: 1.71 10*3/UL (ref 1–5)
LYMPHOCYTES NFR BLD AUTO: 22.2 % (ref 20–45)
MCH RBC QN AUTO: 28.3 PG (ref 27–31)
MCHC RBC AUTO-ENTMCNC: 31.9 G/DL (ref 33–37)
MCV RBC AUTO: 88.8 FL (ref 81–99)
MONOCYTES # BLD AUTO: 0.71 10*3/UL (ref 0.2–1.2)
MONOCYTES NFR BLD AUTO: 9.2 % (ref 3–10)
NEUTROPHILS # BLD AUTO: 5.02 10*3/UL (ref 2–8)
NEUTROPHILS NFR BLD AUTO: 65.3 % (ref 30–85)
NITRITE UR QL STRIP: NEGATIVE
NRBC CBCN: 0 % (ref 0–0.7)
PH UR STRIP.AUTO: 6 [PH] (ref 5–8)
PHOSPHATE SERPL-MCNC: 4.3 MG/DL (ref 2.4–4.5)
PLATELET # BLD AUTO: 249 10*3/UL (ref 130–400)
PMV BLD AUTO: 9.7 FL (ref 9.4–12.3)
POTASSIUM SERPL-SCNC: 4.1 MMOL/L (ref 3.5–5.3)
PROT UR QL: NEGATIVE MG/DL
PROT UR-MCNC: 12.9 MG/DL
RBC # BLD AUTO: 4.84 10*6/UL (ref 4.2–5.4)
RBC #/AREA URNS HPF: ABNORMAL /[HPF]
SODIUM SERPL-SCNC: 139 MMOL/L (ref 135–147)
SP GR UR: 1.02 (ref 1–1.03)
WBC # BLD AUTO: 7.7 10*3/UL (ref 4.8–10.8)
WBC #/AREA URNS HPF: ABNORMAL /[HPF]

## 2020-11-19 ENCOUNTER — HOSPITAL ENCOUNTER (OUTPATIENT)
Dept: FAMILY MEDICINE CLINIC | Facility: CLINIC | Age: 70
Discharge: HOME | End: 2020-11-19
Attending: NURSE PRACTITIONER

## 2020-11-19 ENCOUNTER — OFFICE VISIT CONVERTED (OUTPATIENT)
Dept: FAMILY MEDICINE CLINIC | Facility: CLINIC | Age: 70
End: 2020-11-19
Attending: NURSE PRACTITIONER

## 2020-11-19 ENCOUNTER — OFFICE VISIT CONVERTED (OUTPATIENT)
Dept: PLASTIC SURGERY | Facility: CLINIC | Age: 70
End: 2020-11-19
Attending: PLASTIC SURGERY

## 2020-11-19 LAB
ALBUMIN SERPL-MCNC: 4 G/DL (ref 3.5–5)
ALBUMIN/GLOB SERPL: 1.4 {RATIO} (ref 1.4–2.6)
ALP SERPL-CCNC: 133 U/L (ref 43–160)
ALT SERPL-CCNC: 9 U/L (ref 10–40)
ANION GAP SERPL CALC-SCNC: 14 MMOL/L (ref 8–19)
AST SERPL-CCNC: 17 U/L (ref 15–50)
BILIRUB SERPL-MCNC: 0.36 MG/DL (ref 0.2–1.3)
BNP SERPL-MCNC: 60 PG/ML (ref 0–900)
BUN SERPL-MCNC: 10 MG/DL (ref 5–25)
BUN/CREAT SERPL: 11 {RATIO} (ref 6–20)
C3 SERPL-MCNC: 169 MG/DL (ref 82–167)
C4 SERPL-MCNC: 26 MG/DL (ref 12–38)
CALCIUM SERPL-MCNC: 9.8 MG/DL (ref 8.7–10.4)
CHLORIDE SERPL-SCNC: 99 MMOL/L (ref 99–111)
CHOLEST SERPL-MCNC: 136 MG/DL (ref 107–200)
CHOLEST/HDLC SERPL: 3.4 {RATIO} (ref 3–6)
CONV CO2: 27 MMOL/L (ref 22–32)
CONV TOTAL PROTEIN: 6.9 G/DL (ref 6.3–8.2)
CREAT UR-MCNC: 0.92 MG/DL (ref 0.5–0.9)
EST. AVERAGE GLUCOSE BLD GHB EST-MCNC: 131 MG/DL
GFR SERPLBLD BASED ON 1.73 SQ M-ARVRAT: >60 ML/MIN/{1.73_M2}
GLOBULIN UR ELPH-MCNC: 2.9 G/DL (ref 2–3.5)
GLUCOSE SERPL-MCNC: 92 MG/DL (ref 65–99)
HBA1C MFR BLD: 6.2 % (ref 3.5–5.7)
HDLC SERPL-MCNC: 40 MG/DL (ref 40–60)
IRON SATN MFR SERPL: 18 % (ref 20–55)
IRON SERPL-MCNC: 72 UG/DL (ref 60–170)
LDLC SERPL CALC-MCNC: 57 MG/DL (ref 70–100)
OSMOLALITY SERPL CALC.SUM OF ELEC: 281 MOSM/KG (ref 273–304)
POTASSIUM SERPL-SCNC: 4.1 MMOL/L (ref 3.5–5.3)
SODIUM SERPL-SCNC: 136 MMOL/L (ref 135–147)
T4 FREE SERPL-MCNC: 1.6 NG/DL (ref 0.9–1.8)
TIBC SERPL-MCNC: 406 UG/DL (ref 245–450)
TRANSFERRIN SERPL-MCNC: 284 MG/DL (ref 250–380)
TRIGL SERPL-MCNC: 197 MG/DL (ref 40–150)
TSH SERPL-ACNC: 1.48 M[IU]/L (ref 0.27–4.2)
VLDLC SERPL-MCNC: 39 MG/DL (ref 5–37)

## 2020-11-20 ENCOUNTER — HOSPITAL ENCOUNTER (OUTPATIENT)
Dept: OTHER | Facility: HOSPITAL | Age: 70
Discharge: HOME | End: 2020-11-20
Attending: NURSE PRACTITIONER

## 2020-11-20 ENCOUNTER — HOSPITAL ENCOUNTER (OUTPATIENT)
Dept: FAMILY MEDICINE CLINIC | Facility: CLINIC | Age: 70
Discharge: HOME | End: 2020-11-20
Attending: NURSE PRACTITIONER

## 2020-11-20 LAB
CONV CREATININE URINE, RANDOM: 48.3 MG/DL (ref 10–300)
CONV MICROALBUM.,U,RANDOM: <12 MG/L (ref 0–20)
DSDNA AB SER-ACNC: NEGATIVE [IU]/ML
ENA AB SER IA-ACNC: NEGATIVE {RATIO}
FOLATE SERPL-MCNC: 13.7 NG/ML (ref 4.8–20)
MICROALBUMIN/CREAT UR: 24.8 MG/G{CRE} (ref 0–35)
VIT B12 SERPL-MCNC: 393 PG/ML (ref 211–911)

## 2020-11-21 LAB — CONV ANTI NEUTROPHILIC CYTOPLASMIC AB W/REFLEX: NORMAL

## 2020-11-22 LAB — BACTERIA UR CULT: NORMAL

## 2020-12-03 ENCOUNTER — HOSPITAL ENCOUNTER (OUTPATIENT)
Dept: SLEEP MEDICINE | Facility: HOSPITAL | Age: 70
Discharge: HOME | End: 2020-12-03
Attending: PSYCHIATRY & NEUROLOGY

## 2020-12-09 ENCOUNTER — HOSPITAL ENCOUNTER (OUTPATIENT)
Dept: URGENT CARE | Facility: CLINIC | Age: 70
Discharge: HOME | End: 2020-12-09
Attending: NURSE PRACTITIONER

## 2020-12-09 ENCOUNTER — OFFICE VISIT CONVERTED (OUTPATIENT)
Dept: FAMILY MEDICINE CLINIC | Facility: CLINIC | Age: 70
End: 2020-12-09
Attending: NURSE PRACTITIONER

## 2020-12-11 LAB — SARS-COV-2 RNA SPEC QL NAA+PROBE: NOT DETECTED

## 2020-12-17 ENCOUNTER — HOSPITAL ENCOUNTER (OUTPATIENT)
Dept: FAMILY MEDICINE CLINIC | Facility: CLINIC | Age: 70
Discharge: HOME | End: 2020-12-17
Attending: NURSE PRACTITIONER

## 2020-12-19 LAB — BACTERIA UR CULT: NORMAL

## 2021-01-05 ENCOUNTER — HOSPITAL ENCOUNTER (OUTPATIENT)
Dept: UROLOGY | Facility: CLINIC | Age: 71
Discharge: HOME | End: 2021-01-05
Attending: NURSE PRACTITIONER

## 2021-01-05 ENCOUNTER — OFFICE VISIT CONVERTED (OUTPATIENT)
Dept: SURGERY | Facility: CLINIC | Age: 71
End: 2021-01-05
Attending: NURSE PRACTITIONER

## 2021-01-05 LAB
APPEARANCE UR: ABNORMAL
BILIRUB UR QL: NEGATIVE
COLOR UR: YELLOW
CONV BACTERIA: NEGATIVE
CONV COLLECTION SOURCE (UA): ABNORMAL
CONV HYALINE CASTS IN URINE MICRO: ABNORMAL /[LPF]
CONV UROBILINOGEN IN URINE BY AUTOMATED TEST STRIP: 1 {EHRLICHU}/DL (ref 0.1–1)
GLUCOSE UR QL: NEGATIVE MG/DL
HGB UR QL STRIP: ABNORMAL
KETONES UR QL STRIP: ABNORMAL MG/DL
LEUKOCYTE ESTERASE UR QL STRIP: ABNORMAL
NITRITE UR QL STRIP: NEGATIVE
PH UR STRIP.AUTO: 5.5 [PH] (ref 5–8)
PROT UR QL: NEGATIVE MG/DL
RBC #/AREA URNS HPF: ABNORMAL /[HPF]
SP GR UR: 1.02 (ref 1–1.03)
SQUAMOUS SPT QL MICRO: ABNORMAL /[HPF]
WBC #/AREA URNS HPF: ABNORMAL /[HPF]

## 2021-01-07 LAB — BACTERIA UR CULT: NORMAL

## 2021-02-09 ENCOUNTER — CONVERSION ENCOUNTER (OUTPATIENT)
Dept: CARDIOLOGY | Facility: CLINIC | Age: 71
End: 2021-02-09

## 2021-02-09 ENCOUNTER — OFFICE VISIT CONVERTED (OUTPATIENT)
Dept: CARDIOLOGY | Facility: CLINIC | Age: 71
End: 2021-02-09
Attending: SPECIALIST

## 2021-03-09 ENCOUNTER — OFFICE VISIT CONVERTED (OUTPATIENT)
Dept: NEUROSURGERY | Facility: CLINIC | Age: 71
End: 2021-03-09
Attending: NEUROLOGICAL SURGERY

## 2021-03-17 ENCOUNTER — PROCEDURE VISIT CONVERTED (OUTPATIENT)
Dept: UROLOGY | Facility: CLINIC | Age: 71
End: 2021-03-17
Attending: UROLOGY

## 2021-03-17 ENCOUNTER — CONVERSION ENCOUNTER (OUTPATIENT)
Dept: SURGERY | Facility: CLINIC | Age: 71
End: 2021-03-17

## 2021-03-17 LAB
BILIRUB UR QL STRIP: NEGATIVE
COLOR UR: YELLOW
CONV CLARITY OF URINE: CLEAR
CONV PROTEIN IN URINE BY AUTOMATED TEST STRIP: NEGATIVE
CONV UROBILINOGEN IN URINE BY AUTOMATED TEST STRIP: 0.2
GLUCOSE UR QL: NEGATIVE
HGB UR QL STRIP: NORMAL
KETONES UR QL STRIP: NEGATIVE
LEUKOCYTE ESTERASE UR QL STRIP: NEGATIVE
NITRITE UR QL STRIP: NEGATIVE
PH UR STRIP.AUTO: 7 [PH]
SP GR UR: 1.01

## 2021-05-10 NOTE — PROCEDURES
Procedure Note      Patient Name: Maci Ruvalcaba   Patient ID: [id number]   Sex: Female   YOB: 1950    Primary Care Provider: Dionisio TORRES   Referring Provider: Dionisio TORRES    Visit Date: March 17, 2021    Provider: Lennie Gillette MD   Location: [facility]   Location Address: [address] [place] [address]  [id number]   Location Phone: [phone]          Cystoscopy Procedure:  PROCEDURE: Flexible cystoscope was passed per urethra into the bladder without difficulty after proper consent. The bladder was inspected in a systematic meridian fashion. There were no tumors, lesions, stones, or other abnormalities noted within the bladder. Of note, there was no increased vascularity as well. Both ureteral orifices were identified and were normal in appearance. The flexible cystoscope was removed. The patient tolerated the procedure well.   FOLLOW UP OFFICE NOTE: The CT scan of the Abdomen/Pelvis was normal.           Assessment  · Hematuria     599.70/R31.9      Plan  · Orders  o Cystoscopy (23512) - 599.70/R31.9 - 03/17/2021  · Instructions  o We will follow up in one year or sooner if needed.  o TIME OUT PROCEDURE: Correct patient and birth date; Correct procedure; Correct Physician; Consent signed  o Her workup for hematuria is negative.            Electronically Signed by: Lennie Gillette MD -Author on March 17, 2021 03:37:45 PM

## 2021-05-10 NOTE — TELEPHONE ENCOUNTER
Quick Note      Patient Name: Maci Ruvalcaba   Patient ID: [id number]   Sex: Female   YOB: 1950    Primary Care Provider: Dionisio TORRES   Referring Provider: Hector Fatima    Visit Date: August 18, 2020    Provider: Julia Helms MD   Location: [facility]   Location Address: [address] [place] [address]  [id number]          History Of Present Illness  Phone Call Follow Up Following Surgery:  Phone Call Date: 08/19/2020   Phone call contact number: [phone]   Patient reached: Able to contact the patient on first attempt   Pain Control:    Pain Control: Pain is adequately controlled with current medications   Pain Rating (according to the Alyssa's pain scale): none   Post Surgical Incision:    Surgical Dressing: Some slight reddness, Puffy and swollen, and No drainage   Drain Output: N/A   Other Surgical Concerns:  Adequate Fluid Intake: Yes   Up walking every 2 hours: Yes   Fever: No   Nausea/Emesis: No   Any bowel or bladder concerns: No   Post Op Care instructions clear: Yes   Medication instructions clear: Yes   Any other concerns: No                 Electronically Signed by: Ana Hurst MA -Author on August 19, 2020 10:49:51 AM

## 2021-05-10 NOTE — H&P
History and Physical      Patient Name: Maci Ruvalcaba   Patient ID: [id number]   Sex: Female   YOB: 1950    Primary Care Provider: Dionisio TORRES   Referring Provider: Dionisio TORRES    Visit Date: March 9, 2021    Provider: Joe Lewis MD   Location: [facility]   Location Address: [address] [place] [address]  [id number]   Location Phone: [phone]          Chief Complaint  · Back pain      History Of Present Illness  The patient is a 70 year old /White female, who presents on referral from Dionisio TORRES, for a neurosurgical evaluation of low back pain.   The pain developed gradually several years ago. It is up to 9/10 in severity has an aching quality and radiates into the bilateral hips in a non-specific distribution. The pain does not go below the knee. The pain has been waxing and waning in severity. The pain tends to be maximal at no specific time, but waxes and wanes in severity throughout the day. The patient states the pain is aggravated by walking long distances, lifting, and turning too quickly. It is alleviated by tramadol.   She also reports some numbness and tingling into the left leg (intermittent). She had a lumbar fusion in about 2000. She had a disc herniation at work. She had mostly lower back pain at that time.   RECENT INTERVENTIONS:  She has been previously treated with tramadol, she did PT in 2018. She goes to the chiropractor regularly and this helps some.   INFORMATION REVIEWED:  The following information was reviewed: radiology reports and images. Lumbar radiographs and CT of the lumbar spine revealed previous L4-5 fusion with moderate stenosis at L3-4.       Past Medical History  Allergic rhinitis, chronic; Allergies; Anemia, Unspecified; Arthritis; Asthma; Bladder disorder; CAD (coronary artery disease); Chest pain; Chronic Obstructive Pulmonary Disease; COPD; Dermatochalasis;  Diabetes; Diabetes mellitus type 1; Diverticulitis; GERD; Heart Attack; Heart Disease; Hernia; High blood pressure; High cholesterol; Hyperlipemia; Hyperlipidemia; Hypertension; Limb Swelling; Lipoma; Migraine; Mitral valve prolapse; Reflux; Rheumatic Fever; Seasonal allergies; Shortness Of Air; Shortness of Breath; Sinus trouble; Smoker; Ulcer         Past Surgical History  *Metal Implant; Ablation; Appendectomy; Back; Back surgery; Blepharoplasty of both upper eyelids; Cardiac; Colonoscopy; EAR Surgery; EGD; Eye Implant; Gallbladder; heart surgery; Hernia; Hip Replacement; Hysterectomy; Hysterectomy-Abdominal; Tonsillectomy         Medication List  Accu-Chek Eusebia Plus Meter miscellaneous misc; Accu-Chek Eusebia Plus test strp miscellaneous strip; aspirin 81 mg oral tablet,chewable; Calcium 500 + D 500 mg(1,250mg) -200 unit oral tablet; Claritin 10 mg oral tablet; CPAP supplies; Custom Orthortics; Detrol LA 2 mg oral capsule,extended release 24hr; ferrous sulfate 325 mg (65 mg iron) oral tablet; Flovent Diskus 100 mcg/actuation inhalation blister with device; fluticasone propionate 50 mcg/actuation nasal spray,suspension; glucometer; lancets miscellaneous misc; losartan 25 mg oral tablet; melatonin 2.5 mg oral tablet,chewable; metformin 500 mg oral tablet extended release 24 hr; metoprolol tartrate 100 mg oral tablet; Nitrostat 0.4 mg sublingual tablet, sublingual; omeprazole 40 mg oral capsule,delayed release(DR/EC); potassium 99 mg oral tablet; Probiotic 10 billion cell oral capsule; rosuvastatin 20 mg oral tablet; spironolactone 25 mg oral tablet; tramadol 50 mg oral tablet; Ventolin HFA 90 mcg/actuation inhalation HFA aerosol inhaler; Vitamin D3 1,000 unit oral tablet; Zoloft 25 mg oral tablet         Allergy List  iodine       Allergies Reconciled  Family Medical History  Cancer, Unspecified; Lung cancer; Prostate cancer; Family history of breast cancer         Reproductive History   2 Para 2 0 0 0  "      Social History  Alcohol (Current some day); Alcohol Use (Current some day); lives alone; lives with other; Recreational Drug Use (Never); Retired.; Tobacco (Former);          Immunizations  Name Date Admin   Influenza 10/07/2020   Influenza 10/31/2018   Influenza 12/07/2017   Prevnar 13 02/22/2017         Review of Systems  · Constitutional  o Denies  o : chills, excessive sweating, fatigue, fever, sycope/passing out, weight gain, weight loss  · Eyes  o Denies  o : changes in vision, blurry vision, double vision  · HENT  o Denies  o : loss of hearing, ringing in the ears, ear aches, sore throat, nasal congestion, sinus pain, nose bleeds, seasonal allergies  · Cardiovascular  o Denies  o : blood clots, swollen legs, anemia, easy burising or bleeding, transfusions  · Respiratory  o Denies  o : shortness of breath, dry cough, productive cough, pneumonia, COPD  · Gastrointestinal  o Denies  o : difficulty swallowing, reflux  · Genitourinary  o Denies  o : incontinence  · Neurologic  o Denies  o : headache, seizure, stroke, tremor, loss of balance, falls, dizziness/vertigo, difficulty with sleep, numbness/tingling/paresthesia , difficulty with coordination, difficulty with dexterity, weakness  · Musculoskeletal  o Admits  o : low back pain  o Denies  o : neck stiffness/pain, swollen lymph nodes, muscle aches, joint pain, weakness, spasms, sciatica, pain radiating in arm, pain radiating in leg  · Endocrine  o Denies  o : diabetes, thyroid disorder  · Psychiatric  o Denies  o : anxiety, depression  · All Others Negative      Vitals  Date Time BP Position Site L\R Cuff Size HR RR TEMP (F) WT  HT  BMI kg/m2 BSA m2 O2 Sat FR L/min FiO2        03/09/2021 11:20 AM        97.1 214lbs 5oz 5'  6\" 34.59 2.13             Physical Examination  · Constitutional  o Appearance  o : well-nourished, well developed, alert, in no acute distress, BMI 34  · Respiratory  o Respiratory Effort  o : breathing " unlabored  · Cardiovascular  o Peripheral Vascular System  o :   § Extremities  § : no edema or cyanosis  · Musculoskeletal  o Spine  o :   § Inspection/Palpation  § : WHSS. No spinal TTP  o Right Lower Extremity  o :   § Inspection/Palpation  § : no joint or limb tenderness to palpation, no edema present, no ecchymosis  § Joint Stability  § : joint stability within normal limits  § Range of Motion  § : range of motion normal, no joint crepitations present, no pain on motion, Andi's test negative  o Left Lower Extremity  o :   § Inspection/Palpation  § : no joint or limb tenderness to palpation, no edema present, no ecchymosis  § Joint Stability  § : joint stability within normal limits  § Range of Motion  § : range of motion normal, no joint crepitations present, no pain on motion, Andi's test negative  · Skin and Subcutaneous Tissue  o Extremities  o :   § Right Lower Extremity  § : no lesions or areas of discoloration  § Left Lower Extremity  § : no lesions or areas of discoloration  o Back  o : no lesions or areas of discoloration  · Neurologic  o Mental Status Examination  o :   § Orientation  § : alert and oriented to time, person, place and events  o Motor Examination  o :   § RLE Strength  § : strength normal  § RLE Motor Function  § : tone normal, no atrophy, no abnormal movements noted  § LLE Strength  § : strength normal  § LLE Motor Function  § : tone normal, no atrophy, no abnormal movements noted  o Reflexes  o :   § RLE  § : knee 2/4 and ankle reflexes 1/4, SLR negative  § LLE  § : knee 2/4 and ankle reflexes tr/4, SLR negative  o Sensation  o :   § Light Touch  § : sensation intact to light touch in extremities  o Gait and Station  o :   § Gait Screening  § : walks with cane  · Psychiatric  o Mood and Affect  o : mood normal, affect appropriate              Assessment  · Displacement of lumbar intervertebral disc     722.10/M51.26  · Facet arthropathy, lumbar     721.3/M46.96  L3-4 most  notably  · Lumbago/low back pain     724.3/M54.40  · Lumbar Spinal Stenosis     724.02/M48.06  L3-4 most notably (above L4-5 fusion)      Plan  · Medications  o Medications have been Reconciled  o Transition of Care or Provider Policy  · Instructions  o Encouraged to follow-up with Primary Care Provider for preventative care.  o The ROS and the PFSH were reviewed at today's visit.  o I have discussed the risks and benefits of surgery versus physical therapy, epidural steroids, and other conservative forms of treatment.  o We have discussed the benefits of core strengthening. Patient will work on improving the core muscle strength with low impact activities such as walking, swimming, Pilates, etc.   o She could benefit from weight loss (goal BMI of 30 or less) and core strengthening.   o She might benefit from a facet injection/ablation. She will read about this and let us know if she would like to proceed.   o She will monitor for any worsening pain into the legs and f/u if this develops.             Electronically Signed by: Joe Lewis MD -Author on March 9, 2021 12:23:00 PM

## 2021-05-10 NOTE — H&P
History and Physical      Patient Name: Maci Ruvalcaba   Patient ID: [id number]   Sex: Female   YOB: 1950    Primary Care Provider: Dionisio TORRES   Referring Provider: Hector Fatima    Visit Date: May 1, 2020    Provider: Julia Helms MD   Location: [facility]   Location Address: [address] [place] [address]  [id number]   Location Phone: [phone]          Chief Complaint  · Upper Blepharoplasty      History Of Present Illness  Maci Ruvalcaba is a 69 year old /White female who presents to the office today as a consult from Hector Fatima.   Patient has obtained visual field study. Visual field shows improvement with taping. Patient complains of straining, fatigue, trouble reading, and trouble watching TV, that worsens as the day progresses.   Video Conferencing Visit  Maci Ruvalcaba is a 69 year old /White female who is presenting for evaluation via video conferencing. Verbal consent obtained before beginning visit. Does not smoke. Takes ASA, for MI 2007 and has stents. Pre-DM, taking metformin, does not recall A1C. Need Clearance from Yolande and Dr. Ramirez. Has asthma, COPD, HTN.   The following staff were present during this visit: JANET Morrison       Past Medical History  Allergies; Arthritis; Asthma; CAD (coronary artery disease); COPD; Dermatochalasis; Diabetes mellitus type 1; Diverticulitis; GERD; Heart Attack; Hernia; Hyperlipidemia; Hypertension; Limb Swelling; Lipoma; Migraine; Mitral valve prolapse; Reflux; Rheumatic Fever; Shortness of Breath; Sinus trouble; Smoker; Ulcer         Past Surgical History  Ablation; Back surgery; Colonoscopy; EAR Surgery; EGD; Eye Implant; Gallbladder; heart surgery; Hernia; Hip Replacement; Hysterectomy; Tonsillectomy         Medication List  Accu-Chek Eusebia Plus Meter miscellaneous misc; Accu-Chek Eusebia Plus test strp miscellaneous strip; aspirin  81 mg oral tablet,chewable; Calcium 500 + D 500 mg(1,250mg) -200 unit oral tablet; Claritin 10 mg oral tablet; Custom Orthortics; Detrol LA 2 mg oral capsule,extended release 24hr; Flovent Diskus 100 mcg/actuation inhalation blister with device; fluticasone propionate 50 mcg/actuation nasal spray,suspension; glucometer; lancets miscellaneous misc; losartan 25 mg oral tablet; melatonin 2.5 mg oral tablet,chewable; metformin 500 mg oral tablet extended release 24 hr; metoprolol tartrate 100 mg oral tablet; Nitrostat 0.4 mg sublingual tablet, sublingual; omeprazole 40 mg oral capsule,delayed release(DR/EC); potassium 99 mg oral tablet; rosuvastatin 20 mg oral tablet; spironolactone 25 mg oral tablet; tramadol 50 mg oral tablet; Ventolin HFA 90 mcg/actuation inhalation HFA aerosol inhaler; Vitamin D3 1,000 unit oral tablet         Allergy List  iodine         Family Medical History  Lung cancer         Reproductive History   2 Para 2 0 0 0       Social History  Alcohol (Current some day); Alcohol Use (Current some day); lives alone; Recreational Drug Use (Never); Retired.; Tobacco (Former);          Review of Systems  · Constitutional  o Denies  o : fatigue, night sweats  · Eyes  o Denies  o : double vision, blurred vision  · HENT  o Denies  o : vertigo, recent head injury  · Breasts  o Denies  o : abnormal changes in breast size, additional breast symptoms except as noted in the HPI  · Cardiovascular  o Denies  o : chest pain, irregular heart beats  · Respiratory  o Denies  o : shortness of breath, productive cough  · Gastrointestinal  o Denies  o : nausea, vomiting  · Genitourinary  o Denies  o : dysuria, urinary retention  · Integument  o Denies  o : hair growth change, new skin lesions  · Neurologic  o Denies  o : altered mental status, seizures  · Musculoskeletal  o Denies  o : joint swelling, limitation of motion  · Endocrine  o Denies  o : cold intolerance, heat  intolerance  · Heme-Lymph  o Denies  o : petechiae, lymph node enlargement or tenderness  · Allergic-Immunologic  o Denies  o : frequent illnesses      Physical Examination  · Constitutional  o Appearance  o : well developed, well-nourished, Alert and oriented X3  · Eyes  o Sclerae  o : sclerae white, upper dermatochalasis bilateral, right greater than left, compensates with frontalis with eye opening, subjective improvement when holding eye lids up, evaluation of true eyelid ptosis is limited on this exam,  · Respiratory  o Respiratory Effort  o : breathing unlabored   · Psychiatric  o Judgement and Insight  o : judgment and insight intact  o Mood and Affect  o : mood normal, affect appropriate          Assessment  · Dermatochalasis of both eyelids       Dermatochalasis of right eye, unspecified eyelid     374.87/H02.833  Dermatochalasis of left eye, unspecified eyelid     374.87/H02.836      Plan  · Orders  o Plastics reconstructive visit ([facility]) - - 05/01/2020  · Medications  o Medications have been Reconciled  o Transition of Care or Provider Policy  · Instructions  o The patient has clinically significant bilateral upper lid dermatochalasis causing visual obstruction. She would benefit from upper blepharoplasty. Discussed procedure including risk and benefits. Pt would like to proceed with surgery. Must get medical clearance from cardiology (come off aspirin prior to procedure) and PCP (general medical clearance). Will get clearnance, add to surgical list and call with a pre-op appt.             Electronically Signed by: Julia Helms MD -Author on May 1, 2020 01:55:59 PM

## 2021-05-10 NOTE — H&P
History and Physical      Patient Name: Maci Ruvalcaba   Patient ID: [id number]   Sex: Female   YOB: 1950    Primary Care Provider: Dionisio TORRES   Referring Provider: Sav Velarde MD    Visit Date: 2021    Provider: BRIAN Evans   Location: [facility]   Location Address: [address] [place] [address]  [id number]   Location Phone: [phone]          Chief Complaint  · Microscopic hematuria  · frequency      History Of Present Illness  She was found to have microhematuria at Sav Velarde - [facility] office on 20. This has been present for 3 years. She has not had a workup for hematuria in the past. She denies any UTI's within the past year. She admits frequency. Denies urgency & dysuria. She does have a history of kidney stones. Denies gross hematuria. Admits to some incontinence with laughing and sneezing. Admits to some bilateral flank pain. Denies any abdominal pain. Admits to being a previous smoker for 40+ years. Quit in . CKD stage III.      Previous urinalysis:  :                         :                        :                :  color: yellow                yellow                       yellow               yellow  clarity: clear                 clear                        clear                 clear  S.018                    1.016                       1.012                1.017  pH: 6.0                        6.5                           5.5                   5.0  Pro: negative               negative                   negative            negative  Glu: negative               negative                   negative           negative  ket: negative               negative                   negative            negative  bili: negative               negative                   negative            negative  Occ: small                   small                        small               moderate                              Nitrite: negative          negative                    negative           negative  Uro: 1.0                      1.0                           1.0                   0.2  Leuko: negative           small                        trace                 trace    Creatinine: 1.02          Creatinine: 1.07  GFR: 56                      GFR: 53       Past Medical History  Disease Name Date Onset Notes   Allergic rhinitis, chronic --  --    Allergies --  --    Anemia, Unspecified --  --    Arthritis --  --    Asthma --  --    Bladder Disorder --  --    CAD (coronary artery disease) 08/24/2018 --    Chest pain --  --    Chronic Obstructive Pulmonary Disease --  --    COPD --  --    Dermatochalasis --  --    Diabetes --  --    Diabetes mellitus type 1 02/22/2017 --    Diverticulitis --  --    GERD --  --    Heart Attack --  --    Heart Disease --  --    Hernia --  --    High blood pressure --  --    High cholesterol --  --    Hyperlipemia --  --    Hyperlipidemia --  --    Hypertension --  --    Limb Swelling --  --    Lipoma --  --    Migraine --  --    Mitral valve prolapse --  --    Reflux --  --    Rheumatic Fever --  --    Seasonal allergies --  --    Shortness Of Air --  --    Shortness of Breath --  --    Sinus trouble --  --    Smoker --  --    Ulcer --  --          Past Surgical History  Procedure Name Date Notes   *Metal Implant --  --    Ablation --  --    Appendectomy --  --    Back --  --    Back surgery --  Spinal fusion   Blepharoplasty of both upper eyelids 08/18/2020 --    Cardiac --  --    Colonoscopy --  --    EAR Surgery --  --    EGD --  --    Eye Implant --  yes   Gallbladder --  --    heart surgery --  --    Hernia --  --    Hip Replacement --  --    Hysterectomy --  --    Hysterectomy-Abdominal --  --    Tonsillectomy --  --          Medication List  Name Date Started Instructions   Accu-Chek Eusebia Plus Meter miscellaneous misc 11/12/2018 DX E10.9 use as directed   Accu-Chek Eusebia Plus test strp  miscellaneous strip 11/12/2018 DX E10.9 use as directed   aspirin 81 mg oral tablet,chewable  chew 1 tablet (81 mg) by oral route once daily   Calcium 500 + D 500 mg(1,250mg) -200 unit oral tablet  take 1 tablet by oral route daily   Claritin 10 mg oral tablet 10/07/2020 take 1 tablet (10 mg) by oral route once daily for 90 days   CPAP supplies 10/07/2020 DX SLEEP APNEA SIG USE AS DIRECTED   Custom Orthortics 03/27/2019 Use as instructed.   Detrol LA 2 mg oral capsule,extended release 24hr 12/09/2020 take 1 capsule (2 mg) by oral route once daily for 90 days   EQ LORATADINE 10MG TAB 05/14/2020 TAKE 1 TABLET BY MOUTH ONCE DAILY FOR 90 DAYS   ferrous sulfate 325 mg (65 mg iron) oral tablet 11/20/2020 take 1 tablet (325 mg) by oral route once daily for 90 days   Flovent Diskus 100 mcg/actuation inhalation blister with device 06/09/2020 inhale 1 puff (100 mcg) by inhalation route 2 times per day for 90 days   fluticasone propionate 50 mcg/actuation nasal spray,suspension 06/09/2020 spray 2 sprays (100 mcg) in each nostril by intranasal route once daily as needed for 90 days   glucometer 10/31/2018 dx dm2, sig daily and prn   lancets miscellaneous misc 09/04/2019 DX E10.9 use as directed   losartan 25 mg oral tablet 12/09/2020 TAKE 1 TABLET BY MOUTH ONCE DAILY FOR 90 DAYS for 90 days   melatonin 2.5 mg oral tablet,chewable  chew 1 tablet by oral route daily as needed   metformin 500 mg oral tablet extended release 24 hr 12/09/2020 TAKE 1 TABLET BY MOUTH ONCE DAILY WITH EVENING MEAL FOR 90 DAYS for 90 days   metoprolol tartrate 100 mg oral tablet 12/09/2020 TAKE 1 TABLET BY MOUTH TWICE DAILY FOR 90 DAYS for 90 days   Nitrostat 0.4 mg sublingual tablet, sublingual 07/16/2020 1 tablet (0.4 mg) at the first sign of an attack; no more than 3 tabs are recommended within a 15 minute period   omeprazole 40 mg oral capsule,delayed release(DR/EC) 12/09/2020 TAKE 1 CAPSULE BY MOUTH ONCE DAILY BEFORE A MEAL FOR 90 DAYS   potassium  99 mg oral tablet  take 1 tablet by oral route daily as needed   prednisone 50 mg oral tablet 2020 take 1 tablet by oral route 13 hours before CT, 50mg again 6 hours before CT and 1 hour before CT   Probiotic 10 billion cell oral capsule  take 1 capsule by oral route daily   rosuvastatin 20 mg oral tablet 2020 TAKE 1 TABLET BY MOUTH ONCE DAILY FOR 90 DAYS for 90 days   spironolactone 25 mg oral tablet 2020 TAKE 1 TABLET BY MOUTH TWICE DAILY FOR 90 DAYS for 90 days   tramadol 50 mg oral tablet  take 1 tablet (50 mg) by oral route every 6 hours as needed   Ventolin HFA 90 mcg/actuation inhalation HFA aerosol inhaler 2020 inhale 2 puffs (180 mcg) by inhalation route every 6 hours as needed for 90 days   Vitamin D3 1,000 unit oral tablet  take 1 tablet by oral route 2 times a day   Zoloft 25 mg oral tablet 2020 take 1 tablet (25 mg) by oral route once daily for 30 days         Allergy List  Allergen Name Date Reaction Notes   iodine --  --  --        Allergies Reconciled  Family Medical History  Disease Name Relative/Age Notes   Cancer, Unspecified Father/  Mother/   Mother; Father   Lung cancer Father/  Mother/   --    Prostate cancer  Uncle (maternal)   Family history of breast cancer  Aunt/40s         Reproductive History  Menstrual   Pregnancy Summary   Total Pregnancies: 2 Full Term: 2 Premature: 0   Ab Induced: 0 Ab Spontaneous: 0 Ectopics: 0   Multiples: 0 Livin         Social History  Finding Status Start/Stop Quantity Notes   Alcohol Current some day --/-- rarely 2020 -    Alcohol Use Current some day --/-- --  occasionally drinks   lives alone --  --/-- --  --    lives with other --  --/-- --  --    Recreational Drug Use Never --/-- --  no   Retired. --  --/-- --  --    Tobacco Former  1.5ppd former smoker  1 1/2 ppd x 40 years    --  --/-- --  --          Review of Systems  · Constitutional  o Denies  o : fever, chills  · Cardiovascular  o Denies  o :  "reviewed and unchanged  · Genitourinary  o Admits  o : frequency, incontinence  o Denies  o : urgency, dysuria, nocturia, hematuria, oliguria, change in urine color, urinary retention, difficulty voiding  · Endocrine  o Denies  o : weight loss, reviewed and unchanged      Vitals  Date Time BP Position Site L\R Cuff Size HR RR TEMP (F) WT  HT  BMI kg/m2 BSA m2 O2 Sat FR L/min FiO2 HC       01/05/2021 01:25 PM       14  214lbs 4oz 5'  6\" 34.58 2.13             Physical Examination  · Constitutional  o Appearance  o : Well nourished, well groomed. Atraumatic.           Results  · In-Office Procedures  o Lab procedure  § Automated Dipstick Urinalysis (Surg Spec) WITHOUT Micro [facility] ([id number])   § Color Ur: Yellow   § Clarity Ur: Clear   § Glucose Ur Ql Strip: Negative   § Bilirub Ur Ql Strip: Negative   § Ketones Ur Ql Strip: Negative   § Sp Gr Ur Qn: greater than 1.030   § Hgb Ur Ql Strip: Moderate   § pH Ur-LsCnc: 5.5   § Prot Ur Ql Strip: Negative   § Urobilinogen Ur Strip-mCnc: 0.2 E.U./dL   § Nitrite Ur Ql Strip: Negative   § WBC Est Ur Ql Strip: Trace       Assessment  · Microhematuria     599.72/R31.29  · Urinary frequency     788.41/R35.0  · Flank pain     789.09/R10.9  · Stress incontinence     NOCODE/N39.3    Problems Reconciled  Plan  · Orders  o Urinalysis with Reflex Microscopy if abnormal ([facility]) ([id number]) - 788.41/R35.0, 599.72/R31.29, 789.09/R10.9, NOCODE/N39.3 - 01/05/2021  o Urine Culture (Clean Catch) [facility] ([id number]) - 788.41/R35.0, 599.72/R31.29, 789.09/R10.9, NOCODE/N39.3 - 01/05/2021  · Medications  o Medications have been Reconciled  o Transition of Care or Provider Policy  · Instructions  o Work-up for hematuria will be performed. A CT scan of the abdomen and pelvis with and without IV contrast will be performed to evaluate the kidneys and ureters. A flexible cystoscopy will be performed in the office at the next visit to evaluate the bladder.   o Electronically Identified Patient Education Materials Provided " Electronically  · Disposition  o Call or Return if symptoms worsen or persist.            Electronically Signed by: BRIAN Evans -Author on January 6, 2021 09:47:29 AM

## 2021-05-13 NOTE — PROGRESS NOTES
Progress Note      Patient Name: Maci Ruvalcaba   Patient ID: [id number]   Sex: Female   YOB: 1950    Primary Care Provider: Dionisio TORRES   Referring Provider: Hector Fatima    Visit Date: July 28, 2020    Provider: Braxton Ramirez MD   Location: [facility]   Location Address: [address] [place] [address], [facility]   [place]  [id number]   Location Phone: [phone]          Chief Complaint  · Supraventricular tachycardia   · Shortness of breath   · Palpitations       History Of Present Illness  Maci Ruvalcaba is a 69 year old /White female with a history of palpitations and coronary artery disease. She denies any chest pain. No further palpitations. No syncopal or presyncopal episodes.   CURRENT MEDICATIONS: include Crestor 20 mg daily; Spironolactone 25 mg b.i.d; ASA 81 mg daily; Metoprolol Tartrate 100 mg 1/2 tablet daily; Potassium p.r.n; NTG p.r.n.; Losartan 25 mg daily; Metformin 500 mg daily; Vitamin D3; calcium; Tramadol 50 mg p.r.n.; melatonin 2.5 mg p.r.n.; Fluticasone; Flovent; Albuterol; Prilosec 40 mg daily; Loratadine 10 mg daily; Detrol LA 2 mg daily. The dosage and frequency of the medications were reviewed with the patient.   PAST MEDICAL HISTORY: Chronic obstructive pulmonary disease; coronary artery disease; diabetes mellitus; hyperlipidemia; hypertension; supraventricular tachycardia.   FAMILY HISTORY: Negative for diabetes, hypertension and heart disease.   PSYCHOSOCIAL HISTORY: No history of mood changes or depression. She rarely drinks alcohol and never used tobacco.       Review of Systems  · Cardiovascular  o Admits  o : palpitations (fast, fluttering, or skipping beats), chest pain or angina pectoris   o Denies  o : swelling (feet, ankles, hands), shortness of breath while walking or lying flat  · Respiratory  o Admits  o : chronic or frequent cough, asthma or wheezing      Vitals  Date Time BP Position Site L\R  "Cuff Size HR RR TEMP (F) WT  HT  BMI kg/m2 BSA m2 O2 Sat HC       07/28/2020 11:44 AM 98/68 Sitting    60 - R   216lbs 0oz 5'  6\" 34.86 2.14           Physical Examination  · Constitutional  o Appearance  o : Awake, alert, cooperative, pleasant.  · Respiratory  o Inspection of Chest  o : No chest wall deformities, moving equal.  o Auscultation of Lungs  o : Good air entry with vesicular breath sounds.  · Cardiovascular  o Heart  o :   § Auscultation of Heart  § : S1 and S2 regular. No S3. No S4. No murmurs.  o Peripheral Vascular System  o :   § Extremities  § : Peripheral pulses were well felt. No edema. No cyanosis.  · Gastrointestinal  o Abdominal Examination  o : No masses or organomegaly noted.     EKG was performed in the office today.  Indication:       SVT.  Results:          sinus rhythm, within normal limits.               Assessment     ASSESSMENT AND PLAN:    1.  Paroxysmal SVT, now in sinus rhythm:  Continue current dose of Metoprolol.  2.  Coronary artery disease, s/p PTCA/stent, stable:  Continue current dose of ASA.  3.  Shortness of breath secondary to COPD:  Continue current dose of bronchodilators.  4.  See me back in 6 months.    Braxton Ramirez MD, [facility]  MAUREEN/cathleen           This note was transcribed by Skylar Esquivel.  cathleen/MAUREEN  The above service was transcribed by Skylar Esquivel, and I attest to the accuracy of the note.  MAUREEN               Electronically Signed by: Skylar Esquivel-, -Author on July 29, 2020 09:42:39 AM  Electronically Co-signed by: Braxton Ramirez MD -Reviewer on August 15, 2020 09:40:58 AM  "

## 2021-05-13 NOTE — PROGRESS NOTES
Progress Note      Patient Name: Maci Ruvalcaba   Patient ID: [id number]   Sex: Female   YOB: 1950    Primary Care Provider: Dionisio TORRES   Referring Provider: Hector Fatima    Visit Date: August 3, 2020    Provider: Julia Helms MD   Location: [facility]   Location Address: [address] [place] [address]  [id number]   Location Phone: [phone]          History Of Present Illness  Maci Ruvalcaba is a 69 year old /White female who presents to the office today as a consult from Hector Fatima.      Maci Ruvalcaba is a 69 year old /White female who presents to the office today as a consult from Hector Fatima.   Patient has obtained visual field study. Visual field shows improvement with taping. Patient complains of straining, fatigue, trouble reading, and trouble watching TV, that worsens as the day progresses.      Does not smoke. Takes ASA, for MI 2007 and has stents. Pre-DM, taking metformin, does not recall A1C. Need Clearance from Yolande and Dr. Ramirez. Has asthma, COPD, HTN.     Have received clearance from cardiology and PCP, ready to schedule       Past Medical History  Allergies; Arthritis; Asthma; CAD (coronary artery disease); COPD; Dermatochalasis; Diabetes; Diabetes mellitus type 1; Diverticulitis; GERD; Heart Attack; Hernia; Hyperlipemia; Hyperlipidemia; Hypertension; Limb Swelling; Lipoma; Migraine; Mitral valve prolapse; Reflux; Rheumatic Fever; Seasonal allergies; Shortness of Breath; Sinus trouble; Smoker; Ulcer         Past Surgical History  Ablation; Back surgery; Colonoscopy; EAR Surgery; EGD; Eye Implant; Gallbladder; heart surgery; Hernia; Hip Replacement; Hysterectomy; Tonsillectomy         Medication List  Accu-Chek Eusebia Plus Meter miscellaneous misc; Accu-Chek Eusebia Plus test strp miscellaneous strip; aspirin 81 mg oral tablet,chewable; Calcium 500 + D 500 mg(1,250mg)  -200 unit oral tablet; Claritin 10 mg oral tablet; Custom Orthortics; Detrol LA 2 mg oral capsule,extended release 24hr; EQ LORATADINE 10MG TAB; Flovent Diskus 100 mcg/actuation inhalation blister with device; fluticasone propionate 50 mcg/actuation nasal spray,suspension; glucometer; lancets miscellaneous misc; losartan 25 mg oral tablet; melatonin 2.5 mg oral tablet,chewable; metformin 500 mg oral tablet extended release 24 hr; metoprolol tartrate 100 mg oral tablet; Nitrostat 0.4 mg sublingual tablet, sublingual; omeprazole 40 mg oral capsule,delayed release(DR/EC); potassium 99 mg oral tablet; rosuvastatin 20 mg oral tablet; spironolactone 25 mg oral tablet; tramadol 50 mg oral tablet; Ventolin HFA 90 mcg/actuation inhalation HFA aerosol inhaler; Vitamin D3 1,000 unit oral tablet         Allergy List  iodine         Family Medical History  Cancer, Unspecified; Lung cancer         Reproductive History   2 Para 2 0 0 0       Social History  Alcohol (Current some day); Alcohol Use (Current some day); lives alone; lives with other; Recreational Drug Use (Never); Retired.; Tobacco (Former);          Immunizations  Name Date Admin   Influenza    Influenza    Prevnar 13          Review of Systems  · Cardiovascular  o Denies  o : chest pain, lower extremity edema, Heart Attack, Abnormal EKG  · Respiratory  o Denies  o : shortness of breath, wheezing, cough  · Neurologic  o Denies  o : muscular weakness, incoordination, tingling or numbness, headaches  · Psychiatric  o Denies  o : anxiety, depression, difficulty sleeping      Vitals  Date Time BP Position Site L\R Cuff Size HR RR TEMP (F) WT  HT  BMI kg/m2 BSA m2 O2 Sat        2020 11:11 /72 Sitting    65 - R  97.1     96 %          Physical Examination  · Constitutional  o Appearance  o : well developed, well-nourished, Alert and oriented X3  · Head and Face  o HEENT  o : excess skin bilateral upper lids; significant frontalis compensation; no  ptosis  · Eyes  o Sclerae  o : sclerae white  · Respiratory  o Respiratory Effort  o : breathing unlabored   · Cardiovascular  o Heart  o : regular rate  · Lymphatic  o Axilla  o : No lymphadenopathy present  · Skin and Subcutaneous Tissue  o General Inspection  o : no lesions present, no areas of discoloration, skin turgor normal, texture normal  · Psychiatric  o Judgement and Insight  o : judgment and insight intact  o Mood and Affect  o : mood normal, affect appropriate          Assessment  · Pre-operative examination     V72.84/Z01.818  · Dermatochalasis of both eyelids       Dermatochalasis of right eye, unspecified eyelid     374.87/H02.833  Dermatochalasis of left eye, unspecified eyelid     374.87/H02.836    Problems Reconciled  Plan  · Orders  o Plastics reconstructive visit ([facility]) - - 08/03/2020  · Medications  o Medications have been Reconciled  o Transition of Care or Provider Policy  · Instructions  o Surgical Facility: [facility]   o ****Surgical Orders****  o ****Patient Status****  o ********************  o RISK AND BENEFITS:  o Consent for surgery: Given these options, the patient has verbally expressed an understanding of the risks of surgery and finds these risks acceptable. We will proceed with surgery as soon as possible.  o O.R. PREP: Per protocol  o IV: Per Anesthesia  o SCD's preoperatively  o Please sign permit for: bilateral upper blepharoplasty  o Please do not give any anethesia until patient's site has been marked.   o *******************************************  o PRE- OP MEDICATION ORDER:  o *******************************************  o Kefzol 2 grams IV on call to OR.  o Prior to procedure, patient had negative COVID verbal screening and signed COVID consent.  o The above History and Physical Examination has been completed within 30 days of admission.  o ********************  o The patient has clinically significant bilateral upper lid dermatochalasis causing visual  obstruction. She would benefit from upper blepharoplasty. Discussed procedure including risk and benefits; bleeding, infection, pain, vision loss, scarring. Pt would like to proceed with surgery. Received medical clearance from cardiology (come off aspirin prior to procedure) and PCP (general medical clearance).             Electronically Signed by: Julia Helms MD -Author on August 3, 2020 02:23:55 PM

## 2021-05-13 NOTE — PROGRESS NOTES
Progress Note      Patient Name: Maci Ruvalcaba   Patient ID: [id number]   Sex: Female   YOB: 1950    Primary Care Provider: Dionisio TORRES   Referring Provider: Hector Fatima    Visit Date: November 19, 2020    Provider: Julia Helms MD   Location: [facility]   Location Address: [address] [place] [address]  [id number]   Location Phone: [phone]          History Of Present Illness  Maci Ruvalcaba is a 69 year old /White female who presents to the office today as a consult from Hector Fatima. S/p bilateral upper blepharoplasty 8/18/20. Patient states she still has some soreness. Vision is improved.       Past Medical History  Allergies; Arthritis; Asthma; CAD (coronary artery disease); COPD; Dermatochalasis; Diabetes; Diabetes mellitus type 1; Diverticulitis; GERD; Heart Attack; Hernia; Hyperlipemia; Hyperlipidemia; Hypertension; Limb Swelling; Lipoma; Migraine; Mitral valve prolapse; Reflux; Rheumatic Fever; Seasonal allergies; Shortness of Breath; Sinus trouble; Smoker; Ulcer         Past Surgical History  Ablation; Back surgery; Blepharoplasty of both upper eyelids; Colonoscopy; EAR Surgery; EGD; Eye Implant; Gallbladder; heart surgery; Hernia; Hip Replacement; Hysterectomy; Tonsillectomy         Medication List  Accu-Chek Eusebia Plus Meter miscellaneous misc; Accu-Chek Eusebia Plus test strp miscellaneous strip; aspirin 81 mg oral tablet,chewable; Calcium 500 + D 500 mg(1,250mg) -200 unit oral tablet; Claritin 10 mg oral tablet; CPAP supplies; Custom Orthortics; Detrol LA 2 mg oral capsule,extended release 24hr; EQ LORATADINE 10MG TAB; Flovent Diskus 100 mcg/actuation inhalation blister with device; fluticasone propionate 50 mcg/actuation nasal spray,suspension; glucometer; lancets miscellaneous misc; losartan 25 mg oral tablet; melatonin 2.5 mg oral tablet,chewable; metformin 500 mg oral tablet extended release  24 hr; metoprolol tartrate 100 mg oral tablet; Nitrostat 0.4 mg sublingual tablet, sublingual; omeprazole 40 mg oral capsule,delayed release(DR/EC); potassium 99 mg oral tablet; Probiotic 10 billion cell oral capsule; rosuvastatin 20 mg oral tablet; spironolactone 25 mg oral tablet; tramadol 50 mg oral tablet; Ventolin HFA 90 mcg/actuation inhalation HFA aerosol inhaler; Vitamin D3 1,000 unit oral tablet; Zoloft 25 mg oral tablet         Allergy List  iodine         Family Medical History  Cancer, Unspecified; Lung cancer         Reproductive History   2 Para 2 0 0 0       Social History  Alcohol (Current some day); Alcohol Use (Current some day); lives alone; lives with other; Recreational Drug Use (Never); Retired.; Tobacco (Former);          Immunizations  Name Date Admin   Influenza 10/07/2020   Influenza 10/31/2018   Influenza 2017   Prevnar 13 2017         Vitals  Date Time BP Position Site L\R Cuff Size HR RR TEMP (F) WT  HT  BMI kg/m2 BSA m2 O2 Sat FR L/min FiO2 HC       2020 11:34 /80 Sitting    68 - R  97     94 %  21%          Physical Examination  · Respiratory  o Respiratory Effort  o : breathing unlabored   · Cardiovascular  o Heart  o : regular rate  · Skin and Subcutaneous Tissue  o General Inspection  o : incision healing well, improved contour          Assessment  · Postoperative follow-up     V67.00/Z09      Plan  · Medications  o Medications have been Reconciled  o Transition of Care or Provider Policy  · Instructions  o Aquaphor and scar massage. RTC prn            Electronically Signed by: Julia Helms MD -Author on 2020 11:18:10 AM

## 2021-05-13 NOTE — PROGRESS NOTES
Progress Note      Patient Name: Maci Ruvalcaba   Patient ID: [id number]   Sex: Female   YOB: 1950    Primary Care Provider: Dionisio TORRES   Referring Provider: Hector Fatima    Visit Date: September 3, 2020    Provider: BRIAN Haq   Location: [facility]   Location Address: [address] [place] [address]  [id number]   Location Phone: [phone]          History Of Present Illness  Maci Ruvalcaba is a 69 year old /White female who presents to the office today as a consult from Hector Fatima. S/p bilateral upper blepharoplasty 8/18/20. Here for steri strip removal. Doing well but side sutures where bothersome so removed them. Using ophthalmic bacitracin ointment. Vision is improved.       Past Medical History  Allergies; Arthritis; Asthma; CAD (coronary artery disease); COPD; Dermatochalasis; Diabetes; Diabetes mellitus type 1; Diverticulitis; GERD; Heart Attack; Hernia; Hyperlipemia; Hyperlipidemia; Hypertension; Limb Swelling; Lipoma; Migraine; Mitral valve prolapse; Reflux; Rheumatic Fever; Seasonal allergies; Shortness of Breath; Sinus trouble; Smoker; Ulcer         Past Surgical History  Ablation; Back surgery; Colonoscopy; EAR Surgery; EGD; Eye Implant; Gallbladder; heart surgery; Hernia; Hip Replacement; Hysterectomy; Tonsillectomy         Medication List  Accu-Chek Eusebia Plus Meter miscellaneous misc; Accu-Chek Eusebia Plus test strp miscellaneous strip; aspirin 81 mg oral tablet,chewable; Calcium 500 + D 500 mg(1,250mg) -200 unit oral tablet; Claritin 10 mg oral tablet; Custom Orthortics; Detrol LA 2 mg oral capsule,extended release 24hr; EQ LORATADINE 10MG TAB; Flovent Diskus 100 mcg/actuation inhalation blister with device; fluticasone propionate 50 mcg/actuation nasal spray,suspension; glucometer; hydrocodone-acetaminophen 5-325 mg oral tablet; lancets miscellaneous misc; losartan 25 mg oral tablet;  melatonin 2.5 mg oral tablet,chewable; metformin 500 mg oral tablet extended release 24 hr; metoprolol tartrate 100 mg oral tablet; Nitrostat 0.4 mg sublingual tablet, sublingual; omeprazole 40 mg oral capsule,delayed release(DR/EC); potassium 99 mg oral tablet; rosuvastatin 20 mg oral tablet; spironolactone 25 mg oral tablet; tramadol 50 mg oral tablet; Ventolin HFA 90 mcg/actuation inhalation HFA aerosol inhaler; Vitamin D3 1,000 unit oral tablet         Allergy List  iodine         Family Medical History  Cancer, Unspecified; Lung cancer         Reproductive History   2 Para 2 0 0 0       Social History  Alcohol (Current some day); Alcohol Use (Current some day); lives alone; lives with other; Recreational Drug Use (Never); Retired.; Tobacco (Former);          Immunizations  Name Date Admin   Influenza    Influenza    Prevnar 13          Vitals  Date Time BP Position Site L\R Cuff Size HR RR TEMP (F) WT  HT  BMI kg/m2 BSA m2 O2 Sat HC       2020 01:30 /87 Sitting    62 - R  96.6     94 %          Physical Examination  · Respiratory  o Respiratory Effort  o : breathing unlabored   · Cardiovascular  o Heart  o : regular rate  · Skin and Subcutaneous Tissue  o General Inspection  o : incision healing well, expected swelling, no erythema, no drainage           Assessment  · Postoperative follow-up     V67.00/Z09      Plan  · Orders  o Plastics reconstructive visit (PSREC) - - 2020  · Medications  o Medications have been Reconciled  o Transition of Care or Provider Policy  · Instructions  o Aquaphor and scar massage. RTC 3 month post op.             Electronically Signed by: July Morrison APRN -Author on September 3, 2020 01:48:58 PM

## 2021-05-13 NOTE — PROGRESS NOTES
Progress Note      Patient Name: Maci Ruvalcaba   Patient ID: [id number]   Sex: Female   YOB: 1950    Primary Care Provider: Dionisio TORRES   Referring Provider: Hector Fatima    Visit Date: August 27, 2020    Provider: Julia Helms MD   Location: [facility]   Location Address: [address] [place] [address]  [id number]   Location Phone: [phone]          History Of Present Illness  Maci Ruvalcaba is a 69 year old /White female who presents to the office today as a consult from Hector Fatima. S/p bilateral upper blepharoplasty 8/18/20. Here for suture removal       Past Medical History  Allergies; Arthritis; Asthma; CAD (coronary artery disease); COPD; Dermatochalasis; Diabetes; Diabetes mellitus type 1; Diverticulitis; GERD; Heart Attack; Hernia; Hyperlipemia; Hyperlipidemia; Hypertension; Limb Swelling; Lipoma; Migraine; Mitral valve prolapse; Reflux; Rheumatic Fever; Seasonal allergies; Shortness of Breath; Sinus trouble; Smoker; Ulcer         Past Surgical History  Ablation; Back surgery; Colonoscopy; EAR Surgery; EGD; Eye Implant; Gallbladder; heart surgery; Hernia; Hip Replacement; Hysterectomy; Tonsillectomy         Medication List  Accu-Chek Eusebia Plus Meter miscellaneous misc; Accu-Chek Eusebia Plus test strp miscellaneous strip; aspirin 81 mg oral tablet,chewable; Calcium 500 + D 500 mg(1,250mg) -200 unit oral tablet; Claritin 10 mg oral tablet; Custom Orthortics; Detrol LA 2 mg oral capsule,extended release 24hr; EQ LORATADINE 10MG TAB; Flovent Diskus 100 mcg/actuation inhalation blister with device; fluticasone propionate 50 mcg/actuation nasal spray,suspension; glucometer; hydrocodone-acetaminophen 5-325 mg oral tablet; lancets miscellaneous misc; losartan 25 mg oral tablet; melatonin 2.5 mg oral tablet,chewable; metformin 500 mg oral tablet extended release 24 hr; metoprolol tartrate 100 mg oral tablet;  Nitrostat 0.4 mg sublingual tablet, sublingual; omeprazole 40 mg oral capsule,delayed release(DR/EC); potassium 99 mg oral tablet; rosuvastatin 20 mg oral tablet; spironolactone 25 mg oral tablet; tramadol 50 mg oral tablet; Ventolin HFA 90 mcg/actuation inhalation HFA aerosol inhaler; Vitamin D3 1,000 unit oral tablet         Allergy List  iodine         Family Medical History  Cancer, Unspecified; Lung cancer         Reproductive History   2 Para 2 0 0 0       Social History  Alcohol (Current some day); Alcohol Use (Current some day); lives alone; lives with other; Recreational Drug Use (Never); Retired.; Tobacco (Former);          Immunizations  Name Date Admin   Influenza    Influenza    Prevnar 13          Review of Systems  · Cardiovascular  o Denies  o : chest pain, lower extremity edema, Heart Attack, Abnormal EKG  · Respiratory  o Denies  o : shortness of breath, wheezing, cough  · Neurologic  o Denies  o : muscular weakness, incoordination, tingling or numbness, headaches  · Psychiatric  o Denies  o : anxiety, depression, difficulty sleeping      Vitals  Date Time BP Position Site L\R Cuff Size HR RR TEMP (F) WT  HT  BMI kg/m2 BSA m2 O2 Sat HC       2020 09:05 /84 Sitting    65 - R  96.6     95 %    2020 09:05 /84 Sitting    65 - R  96.6     95 %          Physical Examination  · Respiratory  o Respiratory Effort  o : breathing unlabored   · Cardiovascular  o Heart  o : regular rate  · Skin and Subcutaneous Tissue  o General Inspection  o : incision healing well, no swelling, no erythema, no drainage           Assessment  · Postoperative follow-up     V67.00/Z09    Problems Reconciled  Plan  · Orders  o Plastics reconstructive visit (PSREC) - - 2020  · Medications  o Medications have been Reconciled  o Transition of Care or Provider Policy  · Instructions  o Sutures removed. Keep steri-strips in place. May apply opth bacitracin oint to corners of eye. RTC one week.              Electronically Signed by: Julia Helms MD -Author on August 27, 2020 12:18:50 PM

## 2021-05-13 NOTE — PROGRESS NOTES
Progress Note      Patient Name: Maci Ruvalcaba   Patient ID: [id number]   Sex: Female   YOB: 1950    Primary Care Provider: Dionisio TORRES   Referring Provider: Hector Fatima    Visit Date: June 17, 2020    Provider: Prince Alcala DPM   Location: [facility]   Location Address: [address] [place] [address]  [id number]   Location Phone: [phone]          Chief Complaint  · Diabetic Foot Evaluation      History Of Present Illness  Maci Ruvalcaba presents to the office today as a Follow-Up for a diabetic foot evaluation.   Patient reports that she is a diabetic currently controlling diabetes with oral medication      New, Established, New Problem: est  Location: bilateral feet  Duration: 2015  Onset: gradual  Nature: IDDM  Stable, worsening, improving: stable  Aggravating factors:  Previous Treatment: oral medication    Patient denies any fevers, chills, nausea, vomiting, shortness of breathe, nor any other constitutional signs nor symptoms.    Patient reports the following medical changes since their last visit:    - gall bladder surgery       Past Medical History  Allergies; Arthritis; Asthma; CAD (coronary artery disease); COPD; Dermatochalasis; Diabetes mellitus type 1; Diverticulitis; GERD; Heart Attack; Hernia; Hyperlipidemia; Hypertension; Limb Swelling; Lipoma; Migraine; Mitral valve prolapse; Reflux; Rheumatic Fever; Shortness of Breath; Sinus trouble; Smoker; Ulcer         Past Surgical History  Ablation; Back surgery; Colonoscopy; EAR Surgery; EGD; Eye Implant; Gallbladder; heart surgery; Hernia; Hip Replacement; Hysterectomy; Tonsillectomy         Medication List  Accu-Chek Eusebia Plus Meter miscellaneous misc; Accu-Chek Eusebia Plus test strp miscellaneous strip; aspirin 81 mg oral tablet,chewable; Calcium 500 + D 500 mg(1,250mg) -200 unit oral tablet; Claritin 10 mg oral tablet; Custom Orthortics; Detrol LA 2 mg oral  capsule,extended release 24hr; EQ LORATADINE 10MG TAB; Flovent Diskus 100 mcg/actuation inhalation blister with device; fluticasone propionate 50 mcg/actuation nasal spray,suspension; glucometer; lancets miscellaneous misc; losartan 25 mg oral tablet; melatonin 2.5 mg oral tablet,chewable; metformin 500 mg oral tablet extended release 24 hr; metoprolol tartrate 100 mg oral tablet; Nitrostat 0.4 mg sublingual tablet, sublingual; omeprazole 40 mg oral capsule,delayed release(DR/EC); potassium 99 mg oral tablet; rosuvastatin 20 mg oral tablet; spironolactone 25 mg oral tablet; tramadol 50 mg oral tablet; Ventolin HFA 90 mcg/actuation inhalation HFA aerosol inhaler; Vitamin D3 1,000 unit oral tablet         Allergy List  iodine       Allergies Reconciled  Family Medical History  Lung cancer         Reproductive History   2 Para 2 0 0 0       Social History  Alcohol (Current some day); Alcohol Use (Current some day); lives alone; Recreational Drug Use (Never); Retired.; Tobacco (Former);          Immunizations  Name Date Admin   Influenza    Influenza    Prevnar 13          Review of Systems  · Constitutional  o Denies  o : fatigue, night sweats  · Eyes  o Denies  o : double vision, blurred vision  · HENT  o Denies  o : vertigo, recent head injury  · Cardiovascular  o Denies  o : chest pain, irregular heart beats  · Respiratory  o Denies  o : shortness of breath, productive cough  · Gastrointestinal  o Denies  o : nausea, vomiting  · Genitourinary  o Denies  o : dysuria, urinary retention  · Integument  o Denies  o : hair growth change, new skin lesions  · Neurologic  o Denies  o : altered mental status, seizures  · Musculoskeletal  o Denies  o : joint swelling, limitation of motion  · Endocrine  o Denies  o : cold intolerance, heat intolerance  · Heme-Lymph  o Denies  o : petechiae, lymph node enlargement or tenderness  · Allergic-Immunologic  o Denies  o : frequent illnesses      Vitals  Date Time BP  "Position Site L\R Cuff Size HR RR TEMP (F) WT  HT  BMI kg/m2 BSA m2 O2 Sat HC       06/17/2020 02:35 /55 Sitting    75 - R  97.5 220lbs 0oz 5'  6\" 35.51 2.16 96 %          Physical Examination  · Constitutional  o Appearance  o : overweight, well developed, no obvious deformities present  · Cardiovascular  o Peripheral Vascular System  o :   § Extremities  § : no edema noted  · Musculoskeletal  o Extremeties/Joint  o : Lower extremity muscle strength and range of motion is equal and symmetrical bilaterally. The knees are noted to be in normal alignment. Ankle alignment and range of motion is normal and foot structure is normal. Subtalar, metatarsal and metatarsal-phalangeal joint range of motion is noted to be within normal limits. The digits of both feet are in normal alignment. The gait is normal.  · Left DM Foot Exam  o Sensation  o : Sharp/dull sensation is within normal limits. Alcolu-Laura 5.07 monofilament intact to all assessed areas.   o Visual Inspection  o : Skin is noted to have normal texture and turgor, with no excrescences noted. The toenails are noted to be without disease  o Vascular  o : palpable dorsalis pedis and posterir tibialis pulses present, normal capillary refill  · Right DM Foot Exam  o Sensation  o : Sharp/dull sensation is within normal limits. Alcolu-Laura 5.07 monofilament intact to all assessed areas.   o Visual Inspection  o : Skin is noted to have normal texture and turgor, with no excrescences noted. The toenails are noted to be without disease  o Vascular  o : palpable dorsalis pedis and posterir tibialis pulses present, normal capillary refill          Assessment  · Diabetes     250.00/E11.9      Plan  · Orders  o Diabetic Foot (Motor and Sensory) Exam Completed [facility] (, , 2028F) - - 06/17/2020  · Medications  o Medications have been Reconciled  o Transition of Care or Provider Policy  · Instructions  o I have discussed the findings of this evaluation with " the patient. The discussion included a complete verbal explanation of any changes in the examination results, diagnosis, and the current treatment plan. A schedule for future care needs was explained. If any questions should arise after returning home, I have encouraged the patient to feel free to contact Dr. Alcala. The patient states understanding and agreement with this plan.   o Pt to monitor for problems and to contact Dr. Alcala for follow-up should such signs occur. Patient states understanding and agreement with this plan.   o Diabetic foot exam performed and documented this date, compliant with CQM required standards. Detail of findings as noted in physical exam.Lower extremity Neuro exam for diabetic patient performed and documented this date, compliant with PQRS required standards. Detail of findings as noted in physical exam.Advised patient importance of good routine lower extremity hygiene. Advised patient importance of evaluating for intact skin and pain free nail borders. Advised patient to use mirror to evaluate plantar/ soles of feet for better visualization. Advised patient monitor and phone office to be seen if any cracking to skin, open lesions, painful nail borders or if nails become elongated prior to next visit. Advised patient importance of daily cleansing of lower extremities, followed by good skin cream to maintain normal hydration of skin. Also advised patient importance of close daily monitoring of blood sugar. Advised to regulate diet and medications to maintain control of blood sugar in optimal range. Contact primary care provider if difficulties maintaining blood sugar levels.Advised Patient of presence of Diabetes Mellitus condition. Advised Patient risk of progression and worsening or improvement, then return of condition. Will monitor condition for any change in future. Treat with most appropriate treatment pending status of condition.Counseled and advised patient extensively on  nature and ramifications of diabetes. Standard instructions given to patient for good diabetic foot care and maintenance. Advised importance of careful monitoring to avoid break down and complications secondary to diabetes. Advised patient importance of strict maintenance of blood sugar control. Advised patient of possible ominous results from neglect of condition,i.e.: amputation/ loss of digits, feet and legs, or even death.Patient states understands counseling, will monitor closely, continue good hygiene and routine diabetic foot care. Patient will contact office is questions or problems.   o Patient is to return in one year for their Podiatric Diabetic Evaluation.   o Electronically Identified Patient Education Materials Provided Electronically  · Disposition  o Call or Return if symptoms worsen or persist.            Electronically Signed by: Prince Alcala DPM -Author on June 17, 2020 02:43:41 PM

## 2021-05-13 NOTE — PROGRESS NOTES
Progress Note      Patient Name: Maci Ruvalcaba   Patient ID: [id number]   Sex: Female   YOB: 1950    Primary Care Provider: Dionisio TORRES   Referring Provider: Hector Fatima    Visit Date: July 21, 2020    Provider: Eusebio Ford MD   Location: [facility]   Location Address: [address] [place] [address]  [id number]   Location Phone: [phone]          Chief Complaint  · Left knee pain      History Of Present Illness  Maci Ruvalcaba is a 69 year old /White female who presents today to [facility].      The patient presents today for a follow up evaluation of her left knee. She presents today with a cane. She reports her pain is back with locking of the knee. Her leg swells occasionally. She reports that the injections in the past have helped.                     Past Medical History  Allergies; Arthritis; Asthma; CAD (coronary artery disease); COPD; Dermatochalasis; Diabetes; Diabetes mellitus type 1; Diverticulitis; GERD; Heart Attack; Hernia; Hyperlipemia; Hyperlipidemia; Hypertension; Limb Swelling; Lipoma; Migraine; Mitral valve prolapse; Reflux; Rheumatic Fever; Seasonal allergies; Shortness of Breath; Sinus trouble; Smoker; Ulcer         Past Surgical History  Ablation; Back surgery; Colonoscopy; EAR Surgery; EGD; Eye Implant; Gallbladder; heart surgery; Hernia; Hip Replacement; Hysterectomy; Tonsillectomy         Medication List  Accu-Chek Eusebia Plus Meter miscellaneous misc; Accu-Chek Eusebia Plus test strp miscellaneous strip; aspirin 81 mg oral tablet,chewable; Calcium 500 + D 500 mg(1,250mg) -200 unit oral tablet; Claritin 10 mg oral tablet; Custom Orthortics; Detrol LA 2 mg oral capsule,extended release 24hr; EQ LORATADINE 10MG TAB; Flovent Diskus 100 mcg/actuation inhalation blister with device; fluticasone propionate 50 mcg/actuation nasal spray,suspension; glucometer; lancets miscellaneous misc; losartan 25 mg oral  "tablet; melatonin 2.5 mg oral tablet,chewable; metformin 500 mg oral tablet extended release 24 hr; metoprolol tartrate 100 mg oral tablet; Nitrostat 0.4 mg sublingual tablet, sublingual; omeprazole 40 mg oral capsule,delayed release(DR/EC); potassium 99 mg oral tablet; rosuvastatin 20 mg oral tablet; spironolactone 25 mg oral tablet; tramadol 50 mg oral tablet; Ventolin HFA 90 mcg/actuation inhalation HFA aerosol inhaler; Vitamin D3 1,000 unit oral tablet         Allergy List  iodine       Allergies Reconciled  Family Medical History  Cancer, Unspecified; Lung cancer         Reproductive History   2 Para 2 0 0 0       Social History  Alcohol (Current some day); Alcohol Use (Current some day); lives alone; lives with other; Recreational Drug Use (Never); Retired.; Tobacco (Former);          Immunizations  Name Date Admin   Influenza    Influenza    Prevnar 13          Review of Systems  · Constitutional  o Denies  o : fever, chills, weight loss  · Cardiovascular  o Denies  o : chest pain, shortness of breath  · Gastrointestinal  o Denies  o : liver disease, heartburn, nausea, blood in stools  · Genitourinary  o Denies  o : painful urination, blood in urine  · Integument  o Denies  o : rash, itching  · Neurologic  o Denies  o : headache, weakness, loss of consciousness  · Musculoskeletal  o Denies  o : painful, swollen joints  · Psychiatric  o Denies  o : drug/alcohol addiction, anxiety, depression      Vitals  Date Time BP Position Site L\R Cuff Size HR RR TEMP (F) WT  HT  BMI kg/m2 BSA m2 O2 Sat        2020 03:16 PM      61 - R 20  218lbs 0oz 5'  6\" 35.19 2.15 97 %          Physical Examination  · Constitutional  o Appearance  o : well developed, well-nourished, no obvious deformities present  · Head and Face  o Head  o :   § Inspection  § : normocephalic  o Face  o :   § Inspection  § : no facial lesions  · Eyes  o Conjunctivae  o : conjunctivae normal  o Sclerae  o : sclerae white  · Ears, " Nose, Mouth and Throat  o Ears  o :   § External Ears  § : appearance within normal limits  § Hearing  § : intact  o Nose  o :   § External Nose  § : appearance normal  · Neck  o Inspection/Palpation  o : normal appearance  o Range of Motion  o : full range of motion  · Respiratory  o Respiratory Effort  o : breathing unlabored  o Inspection of Chest  o : normal appearance  o Auscultation of Lungs  o : no audible wheezing or rales  · Cardiovascular  o Heart  o : regular rate  · Gastrointestinal  o Abdominal Examination  o : soft and non-tender  · Skin and Subcutaneous Tissue  o General Inspection  o : intact, no rashes  · Psychiatric  o General  o : Alert and oriented x3  o Judgement and Insight  o : judgment and insight intact  o Mood and Affect  o : mood normal, affect appropriate  · Left Knee  o Inspection  o : Neurovascularly intact, sensation is intact, goes straight, bends to 125, no bruising, skin is intact.  · Injection Note/Aspiration Note  o Site  o : left knee  o Procedure  o : Procedure: After educating the patient, patient gave consent for procedure. After using Chloraprep, the joint space was injected. The patient tolerated the procedure well.  o Medication  o : 80 mg of DepoMedrol with 9cc of 1% Lidocaine  · In Office Procedures  o View  o : LAT/SUNRISE/STANDING  o Site  o : left knee   o Indication  o : left knee pain  o Study  o : X-rays ordered, taken in the office, and reviewed today.  o Xray  o : moderately advanced tricompartmental degenerative changes.  o Comparative Data  o : No comparative data found              Assessment  · Primary osteoarthritis of left knee     715.16/M17.12  · Lateral meniscus tear-left     836.1/S83.289A  · Left knee pain, unspecified chronicity     719.46/M25.562      Plan  · Orders  o Depo-Medrol injection 80mg () - - 07/21/2020   Lot [id number] Exp 06 2021 [employer] Administered by ELISEO KHANNA MD  o Knee Intra-articular Injection without US  Guidance [facility] ([id number]) - - 07/21/2020   Lot [id number] Exp 07 01 2021 [employer] Administered by ELISEO KHANNA MD  o Knee (Left) [facility] Preferred View ([id number]) - 719.46/M25.562 - 07/21/2020  · Medications  o Medications have been Reconciled  o Transition of Care or Provider Policy  · Instructions  o Dr. Khanna saw and examined the patient and agrees with plan.   o Reviewed the patient's Past Medical, Social, and Family history as well as the ROS at today's visit, no changes.  o Call or return if worsening symptoms.  o The above service was scribed by Bhupendra Robb on my behalf and I attest to the accuracy of the note. jsb  o We discussed the plan with the patient. The patient wishes to proceed with an injection today, she tolerated this well. Follow up with us as needed.   o Electronically Identified Patient Education Materials Provided Electronically            Electronically Signed by: Rafael Robb - , Other -Author on July 29, 2020 08:57:19 AM  Electronically Co-signed by: Eliseo Khanna MD -Reviewer on July 30, 2020 10:20:32 PM
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
## 2021-05-13 NOTE — PROGRESS NOTES
Progress Note      Patient Name: Maci Ruvalcaba   Patient ID: [id number]   Sex: Female   YOB: 1950    Primary Care Provider: Dionisio TORRES   Referring Provider: Hector Fatima    Visit Date: June 9, 2020    Provider: BRIAN Pierson   Location: [facility]   Location Address: [address] [place] [address]  [id number]   Location Phone: [phone]          Chief Complaint  · Follow up HTN, DM2, COPD, hyperlipidemia, and back pain       History Of Present Illness  Maci Ruvalcaba is a 69 year old /White female who presents for evaluation and treatment of:      Follow up HTN, DM2, COPD, hyperlipidemia, and back pain       Does not check blood sugars unless she feels like she needs to  last foot exam 3.2019 rescheduled due to covid 19  last eye exam  dr fatima            Past Medical History  Disease Name Date Onset Notes   Allergies --  --    Arthritis --  --    Asthma --  --    CAD (coronary artery disease) 08/24/2018 --    COPD --  --    Dermatochalasis --  --    Diabetes mellitus type 1 02/22/2017 --    Diverticulitis --  --    GERD --  --    Heart Attack --  --    Hernia --  --    Hyperlipidemia --  --    Hypertension --  --    Limb Swelling --  --    Lipoma --  --    Migraine --  --    Mitral valve prolapse --  --    Reflux --  --    Rheumatic Fever --  --    Shortness of Breath --  --    Sinus trouble --  --    Smoker --  --    Ulcer --  --          Past Surgical History  Procedure Name Date Notes   Ablation --  --    Back surgery --  Spinal fusion   Colonoscopy --  --    EAR Surgery --  --    EGD --  --    Eye Implant --  yes   Gallbladder --  --    heart surgery --  --    Hernia --  --    Hip Replacement --  --    Hysterectomy --  --    Tonsillectomy --  --          Medication List  Name Date Started Instructions   Accu-Chek Eusebia Plus Meter miscellaneous misc 11/12/2018 DX E10.9 use as directed   Accu-Chek Eusebia Plus test  strp miscellaneous strip 11/12/2018 DX E10.9 use as directed   aspirin 81 mg oral tablet,chewable  chew 1 tablet (81 mg) by oral route once daily   Calcium 500 + D 500 mg(1,250mg) -200 unit oral tablet  take 1 tablet by oral route daily   Claritin 10 mg oral tablet 05/05/2020 take 1 tablet (10 mg) by oral route once daily for 90 days   Custom Orthortics 03/27/2019 Use as instructed.   Detrol LA 2 mg oral capsule,extended release 24hr 06/09/2020 take 1 capsule (2 mg) by oral route once daily for 90 days   EQ LORATADINE 10MG TAB 05/14/2020 TAKE 1 TABLET BY MOUTH ONCE DAILY FOR 90 DAYS   Flovent Diskus 100 mcg/actuation inhalation blister with device 06/09/2020 inhale 1 puff (100 mcg) by inhalation route 2 times per day for 90 days   fluticasone propionate 50 mcg/actuation nasal spray,suspension 06/09/2020 spray 2 sprays (100 mcg) in each nostril by intranasal route once daily as needed for 90 days   glucometer 10/31/2018 dx dm2, sig daily and prn   lancets miscellaneous misc 09/04/2019 DX E10.9 use as directed   losartan 25 mg oral tablet 06/09/2020 TAKE 1 TABLET BY MOUTH ONCE DAILY FOR 90 DAYS   melatonin 2.5 mg oral tablet,chewable  chew 1 tablet by oral route daily as needed   metformin 500 mg oral tablet extended release 24 hr 06/09/2020 TAKE 1 TABLET BY MOUTH ONCE DAILY WITH EVENING MEAL FOR 90 DAYS   metoprolol tartrate 100 mg oral tablet 06/09/2020 TAKE 1 TABLET BY MOUTH TWICE DAILY FOR 90 DAYS   Nitrostat 0.4 mg sublingual tablet, sublingual 08/24/2018 place 1 tablet (0.4 mg) by buccal route at the first sign of an attack; no more than 3 tabs are recommended within a 15 minute period.   omeprazole 40 mg oral capsule,delayed release(DR/EC) 01/23/2020 TAKE 1 CAPSULE BY MOUTH ONCE DAILY BEFORE A MEAL FOR 90 DAYS   potassium 99 mg oral tablet  take 1 tablet by oral route daily as needed   rosuvastatin 20 mg oral tablet 06/09/2020 TAKE 1 TABLET BY MOUTH ONCE DAILY FOR 90 DAYS   spironolactone 25 mg oral tablet  2020 TAKE 1 TABLET BY MOUTH TWICE DAILY FOR 90 DAYS   tramadol 50 mg oral tablet  take 1 tablet (50 mg) by oral route every 6 hours as needed   Ventolin HFA 90 mcg/actuation inhalation HFA aerosol inhaler 2020 inhale 2 puffs (180 mcg) by inhalation route every 6 hours as needed for 90 days   Vitamin D3 1,000 unit oral tablet  take 1 tablet by oral route 2 times a day         Allergy List  Allergen Name Date Reaction Notes   iodine --  --  --          Family Medical History  Disease Name Relative/Age Notes   Lung cancer Father/  Mother/   --          Reproductive History  Menstrual   Pregnancy Summary   Total Pregnancies: 2 Full Term: 2 Premature: 0   Ab Induced: 0 Ab Spontaneous: 0 Ectopics: 0   Multiples: 0 Livin         Social History  Finding Status Start/Stop Quantity Notes   Alcohol Current some day --/-- rarely --    Alcohol Use Current some day --/-- --  rarely drinks   lives alone --  --/-- --  --    Recreational Drug Use Never --/-- --  no   Retired. --  --/-- --  --    Tobacco Former  1.5 ppd 1 1/2 ppd x 40 years    --  --/-- --  --          Immunizations  NameDate Admin Mfg Trade Name Lot Number Route Inj VIS Given VIS Publication   Uinjyukmz50/ [facility] Fluzone Quadrivalent [id number] IM RD 10/31/2018 2015   Comments: pt. tolerated well ndc: 87506-504-65   Ehltmiytz98/07/2017 [facility] Fluzone Quadrivalent [id number] IM LD 2017   Comments: pt tolerated   Prevnar 1302017 [facility] PREVNAR 13 [id number] IM RD 2017   Comments: tolerated well         Review of Systems  · Constitutional  o Denies  o : fatigue  · Eyes  o Denies  o : blurred vision, changes in vision  · HENT  o Denies  o : headaches  · Cardiovascular  o Denies  o : chest pain  · Respiratory  o Denies  o : cough  · Gastrointestinal  o Denies  o : nausea, vomiting, diarrhea, constipation, abdominal pain  · Genitourinary  o Denies  o : dysuria  · Integument  o Denies  o :  "rash  · Neurologic  o Denies  o : tingling or numbness  · Musculoskeletal  o Admits  o : joint pain, knee pain  · Endocrine  o Admits  o : central obesity  o Denies  o : polyuria, polydipsia      Vitals  Date Time BP Position Site L\R Cuff Size HR RR TEMP (F) WT  HT  BMI kg/m2 BSA m2 O2 Sat HC       01/23/2020 12:04 PM        97.8 219lbs 0oz 5'  6\" 35.35 2.15     01/23/2020 12:27 /59 Sitting    77 - R           06/09/2020 10:44 /73 Sitting    63 - R  97.2 221lbs 6oz 5'  6\" 35.73 2.16 97 %    06/09/2020 11:06 /82 Sitting                     Physical Examination  · Constitutional  o Appearance  o : well-nourished, in no acute distress  · Eyes  o Conjunctivae  o : conjunctivae normal  o Sclerae  o : sclerae white  o Pupils and Irises  o : pupils equal and round  o Eyelids/Ocular Adnexae  o : eyelid appearance normal, no exudates present  · Ears, Nose, Mouth and Throat  o Ears  o :   § External Ears  § : external auditory canal appearance within normal limits, no discharge present  § Otoscopic Examination  § : tympanic membrane appearance within normal limits bilaterally, cerumen not present  o Nose  o :   § External Nose  § : appearance normal  § Intranasal Exam  § : mucosa within normal limits  § Nasopharynx  § : no discharge present  o Oral Cavity  o :   § Oral Mucosa  § : oral mucosa normal  § Lips  § : lip appearance normal  § Teeth  § : normal dentation for age  o Throat  o :   § Oropharynx  § : no inflammation or lesions present  · Neck  o Inspection/Palpation  o : normal appearance, no masses or tenderness, trachea midline  o Thyroid  o : gland size normal, nontender  · Respiratory  o Respiratory Effort  o : breathing unlabored  o Inspection of Chest  o : normal appearance  o Auscultation of Lungs  o : normal breath sounds throughout inspiration and expiration  · Cardiovascular  o Heart  o :   § Auscultation of Heart  § : regular rate and rhythm, no murmurs  o Peripheral Vascular System  o : "   § Carotid Arteries  § : no bruits present  § Pedal Pulses  § : pulses 2 bilaterally  § Extremities  § : no clubbing or edema  · Gastrointestinal  o Abdominal Examination  o : abdomen nontender to palpation, tone normal without rigidity or guarding, no masses present, bowel sounds present  · Lymphatic  o Neck  o : no lymphadenopathy present  · Skin and Subcutaneous Tissue  o General Inspection  o : pink, warm, dry  · Neurologic  o Mental Status Examination  o :   § Orientation  § : grossly oriented to person, place and time  o Gait and Station  o : ambulation with cane  · Psychiatric  o Judgement and Insight  o : judgment and insight intact  o Mood and Affect  o : mood normal, affect appropriate          Assessment  · COPD (chronic obstructive pulmonary disease)     496/J44.9  · Diabetes mellitus, type 2     250.00/E11.9  · Essential hypertension     401.9/I10  · Hyperlipidemia     272.4/E78.5  · Back pain     724.5/M54.9      Plan  · Orders  o Diabetes 2 Panel (Urine Microalbumin, CMP, Lipid, A1c, ) [facility] (50646, 73268, 52818, 41156) - 250.00/E11.9 - 06/09/2020  o CBC with Auto Diff [facility] (96121) - 250.00/E11.9 - 06/09/2020  o Urinalysis with Reflex Microscopy if abnormal ([facility]) (69969) - 250.00/E11.9 - 06/09/2020  o Thyroid Profile (06675, 49999, THYII) - 250.00/E11.9 - 06/09/2020  o OPHTHALMOLOGY CONSULTATION (OPHTH) - 250.00/E11.9 - 06/09/2020   please request records from dr orozco   o ACO-39: Current medications updated and reviewed () - - 06/09/2020  o Diabetes 1 Panel (CMP, Lipid, A1c) [facility] (60290, 13092, 39706) - 250.00/E11.9 - 06/09/2020  · Medications  o Detrol LA 2 mg oral capsule,extended release 24hr   SIG: take 1 capsule (2 mg) by oral route once daily for 90 days   DISP: (90) capsules with 1 refills  Refilled on 06/09/2020     o Flovent Diskus 100 mcg/actuation inhalation blister with device   SIG: inhale 1 puff (100 mcg) by inhalation route 2 times per day for 90 days   DISP: (3) 28 ct blist pack  with 1 refills  Refilled on 06/09/2020     o fluticasone propionate 50 mcg/actuation nasal spray,suspension   SIG: spray 2 sprays (100 mcg) in each nostril by intranasal route once daily as needed for 90 days   DISP: (3) 9.9 ml aer w/adap with 1 refills  Refilled on 06/09/2020     o losartan 25 mg oral tablet   SIG: TAKE 1 TABLET BY MOUTH ONCE DAILY FOR 90 DAYS   DISP: (90) Tablet with 1 refills  Refilled on 06/09/2020     o metformin 500 mg oral tablet extended release 24 hr   SIG: TAKE 1 TABLET BY MOUTH ONCE DAILY WITH EVENING MEAL FOR 90 DAYS   DISP: (90) Tablet with 1 refills  Refilled on 06/09/2020     o metoprolol tartrate 100 mg oral tablet   SIG: TAKE 1 TABLET BY MOUTH TWICE DAILY FOR 90 DAYS   DISP: (180) Tablet with 1 refills  Refilled on 06/09/2020     o rosuvastatin 20 mg oral tablet   SIG: TAKE 1 TABLET BY MOUTH ONCE DAILY FOR 90 DAYS   DISP: (90) Tablet with 1 refills  Refilled on 06/09/2020     o spironolactone 25 mg oral tablet   SIG: TAKE 1 TABLET BY MOUTH TWICE DAILY FOR 90 DAYS   DISP: (180) Tablet with 1 refills  Refilled on 06/09/2020     o Ventolin HFA 90 mcg/actuation inhalation HFA aerosol inhaler   SIG: inhale 2 puffs (180 mcg) by inhalation route every 6 hours as needed for 90 days   DISP: (1) 8 gm canister with 1 refills  Refilled on 06/09/2020     · Instructions  o Advised that cheeses and other sources of dairy fats, animal fats, fast food, and the extras (candy, pastries, pies, doughnuts and cookies) all contain LDL raising nutrients. Advised to increase fruits, vegetables, whole grains, and to monitor portion sizes.   o Patient was educated/instructed on their diagnosis, treatment and medications prior to discharge from the clinic today.            Electronically Signed by: BRIAN Pierson - on June 9, 2020 02:31:26 PM

## 2021-05-13 NOTE — PROGRESS NOTES
Progress Note      Patient Name: Maci Ruvalcaba   Patient ID: [id number]   Sex: Female   YOB: 1950    Primary Care Provider: Dionisio TORRES   Referring Provider: Hector Fatima    Visit Date: December 9, 2020    Provider: BRIAN Pierson   Location: [facility]   Location Address: [address] [place] [address]  [id number]   Location Phone: [phone]          Chief Complaint  · 6 month follow up for Hypertension, DM2, COPD, hyperlipidemia, and back pain      History Of Present Illness  Maci Ruvalcaba is a 70 year old /White female who presents for evaluation and treatment of:      6 month follow up for Hypertension, DM2, COPD, hyperlipidemia, and back pain  medication refills  lab results     c/o- has not been feeling well since 12/5.   warm feels throughout her body and has had a few episodes of that since Saturday.   has been more fatigued, has been warm and cold at the same time.   Gets a hot spot in the center of her chest when she coughs.     chest xray 11.20.220 negative  pt states she stopped the Zoloft because she thought that is what was making her sick, symptoms did not improve when she stopped Zoloft but did not restart    eye-NEEDS  foot- 2020 [facility]  mammo-3/11/2020  dexa- 12/3/2018 NEEDS           Past Medical History  Disease Name Date Onset Notes   Allergies --  --    Arthritis --  --    Asthma --  --    CAD (coronary artery disease) 08/24/2018 --    COPD --  --    Dermatochalasis --  --    Diabetes --  --    Diabetes mellitus type 1 02/22/2017 --    Diverticulitis --  --    GERD --  --    Heart Attack --  --    Hernia --  --    Hyperlipemia --  --    Hyperlipidemia --  --    Hypertension --  --    Limb Swelling --  --    Lipoma --  --    Migraine --  --    Mitral valve prolapse --  --    Reflux --  --    Rheumatic Fever --  --    Seasonal allergies --  --    Shortness of Breath --  --    Sinus  trouble --  --    Smoker --  --    Ulcer --  --          Past Surgical History  Procedure Name Date Notes   Ablation --  --    Back surgery --  Spinal fusion   Blepharoplasty of both upper eyelids 08/18/2020 --    Colonoscopy --  --    EAR Surgery --  --    EGD --  --    Eye Implant --  yes   Gallbladder --  --    heart surgery --  --    Hernia --  --    Hip Replacement --  --    Hysterectomy --  --    Tonsillectomy --  --          Medication List  Name Date Started Instructions   Accu-Chek Eusebia Plus Meter miscellaneous misc 11/12/2018 DX E10.9 use as directed   Accu-Chek Eusebia Plus test strp miscellaneous strip 11/12/2018 DX E10.9 use as directed   aspirin 81 mg oral tablet,chewable  chew 1 tablet (81 mg) by oral route once daily   Benadryl 25 mg oral capsule 12/08/2020 take 2 capsules (50 mg) by oral route 13 hours before CT, 50mg again 6 hours before CT and 50mg 1 hour before CT   Calcium 500 + D 500 mg(1,250mg) -200 unit oral tablet  take 1 tablet by oral route daily   Claritin 10 mg oral tablet 10/07/2020 take 1 tablet (10 mg) by oral route once daily for 90 days   CPAP supplies 10/07/2020 DX SLEEP APNEA SIG USE AS DIRECTED   Custom Orthortics 03/27/2019 Use as instructed.   Detrol LA 2 mg oral capsule,extended release 24hr 12/09/2020 take 1 capsule (2 mg) by oral route once daily for 90 days   EQ LORATADINE 10MG TAB 05/14/2020 TAKE 1 TABLET BY MOUTH ONCE DAILY FOR 90 DAYS   ferrous sulfate 325 mg (65 mg iron) oral tablet 11/20/2020 take 1 tablet (325 mg) by oral route once daily for 90 days   Flovent Diskus 100 mcg/actuation inhalation blister with device 06/09/2020 inhale 1 puff (100 mcg) by inhalation route 2 times per day for 90 days   fluticasone propionate 50 mcg/actuation nasal spray,suspension 06/09/2020 spray 2 sprays (100 mcg) in each nostril by intranasal route once daily as needed for 90 days   glucometer 10/31/2018 dx dm2, sig daily and prn   lancets miscellaneous misc 09/04/2019 DX E10.9 use as  directed   losartan 25 mg oral tablet 12/09/2020 TAKE 1 TABLET BY MOUTH ONCE DAILY FOR 90 DAYS for 90 days   melatonin 2.5 mg oral tablet,chewable  chew 1 tablet by oral route daily as needed   metformin 500 mg oral tablet extended release 24 hr 12/09/2020 TAKE 1 TABLET BY MOUTH ONCE DAILY WITH EVENING MEAL FOR 90 DAYS for 90 days   metoprolol tartrate 100 mg oral tablet 12/09/2020 TAKE 1 TABLET BY MOUTH TWICE DAILY FOR 90 DAYS for 90 days   Nitrostat 0.4 mg sublingual tablet, sublingual 07/16/2020 1 tablet (0.4 mg) at the first sign of an attack; no more than 3 tabs are recommended within a 15 minute period   omeprazole 40 mg oral capsule,delayed release(DR/EC) 12/09/2020 TAKE 1 CAPSULE BY MOUTH ONCE DAILY BEFORE A MEAL FOR 90 DAYS   potassium 99 mg oral tablet  take 1 tablet by oral route daily as needed   prednisone 50 mg oral tablet 12/08/2020 take 1 tablet by oral route 13 hours before CT, 50mg again 6 hours before CT and 1 hour before CT   Probiotic 10 billion cell oral capsule  take 1 capsule by oral route daily   rosuvastatin 20 mg oral tablet 12/09/2020 TAKE 1 TABLET BY MOUTH ONCE DAILY FOR 90 DAYS for 90 days   spironolactone 25 mg oral tablet 12/09/2020 TAKE 1 TABLET BY MOUTH TWICE DAILY FOR 90 DAYS for 90 days   tramadol 50 mg oral tablet  take 1 tablet (50 mg) by oral route every 6 hours as needed   Ventolin HFA 90 mcg/actuation inhalation HFA aerosol inhaler 06/09/2020 inhale 2 puffs (180 mcg) by inhalation route every 6 hours as needed for 90 days   Vitamin D3 1,000 unit oral tablet  take 1 tablet by oral route 2 times a day   Zoloft 25 mg oral tablet 11/19/2020 take 1 tablet (25 mg) by oral route once daily for 30 days         Allergy List  Allergen Name Date Reaction Notes   iodine --  --  --          Family Medical History  Disease Name Relative/Age Notes   Cancer, Unspecified Father/  Mother/   Mother; Father   Lung cancer Father/  Mother/   --          Reproductive History  Menstrual   Pregnancy  "Summary   Total Pregnancies: 2 Full Term: 2 Premature: 0   Ab Induced: 0 Ab Spontaneous: 0 Ectopics: 0   Multiples: 0 Livin         Social History  Finding Status Start/Stop Quantity Notes   Alcohol Current some day --/-- rarely 2020 -    Alcohol Use Current some day --/-- --  occasionally drinks   lives alone --  --/-- --  --    lives with other --  --/-- --  --    Recreational Drug Use Never --/-- --  no   Retired. --  --/-- --  --    Tobacco Former  1.5ppd former smoker  1  ppd x 40 years    --  --/-- --  --          Immunizations  NameDate Admin Mfg Trade Name Lot Number Route Inj VIS Given VIS Publication   [id number] [facility] Fluzone Quadrivalent [id number] [facility] 10/07/2020 08/15/2019   Comments: ndc [id number]   Prevnar [id number] [facility] PREVNAR 13 [id number] IM RD 2017   Comments: tolerated well         Review of Systems  · Constitutional  o Admits  o : fatigue, chills, body aches  · Eyes  o Denies  o : changes in vision  · HENT  o Admits  o : nasal discharge, postnasal drainage  o Denies  o : ear pain, sore throat  · Cardiovascular  o Denies  o : chest Pain  · Respiratory  o Admits  o : frequent cough  o Denies  o : shortness of breath  · Gastrointestinal  o Denies  o : abdominal pain, nausea, vomiting, diarrhea  · Genitourinary  o Denies  o : dysuria  · Neurologic  o Denies  o : headache, dizziness  · Musculoskeletal  o Admits  o : muscle aches  · Endocrine  o Denies  o : polydipsia, polyphagia  · Psychiatric  o Admits  o : depression  o Denies  o : SI/HI      Vitals  Date Time BP Position Site L\R Cuff Size HR RR TEMP (F) WT  HT  BMI kg/m2 BSA m2 O2 Sat FR L/min FiO2 HC       2020 11:34 /80 Sitting    68 - R  97     94 %  21%    2020 01:55 /68 Sitting    64 - R 24 97.3 216lbs 2oz 5'  6\" 34.88 2.14 92 %      2020 02:39 /67 Sitting    58 - R 20 98 214lbs 16oz 5'  6\" 34.7 2.13 96 %            Physical " Examination  · Constitutional  o Appearance  o : well-nourished, in no acute distress  · Eyes  o Conjunctivae  o : conjunctivae normal  o Sclerae  o : sclerae white  o Pupils and Irises  o : pupils equal and round  o Eyelids/Ocular Adnexae  o : eyelid appearance normal, no exudates present  · Ears, Nose, Mouth and Throat  o Ears  o :   § External Ears  § : external auditory canal appearance within normal limits, no discharge present  § Otoscopic Examination  § : tympanic membrane appearance within normal limits bilaterally  o Nose  o :   § External Nose  § : appearance normal  § Intranasal Exam  § : mucosa within normal limits  § Nasopharynx  § : no discharge present  o Oral Cavity  o :   § Oral Mucosa  § : oral mucosa normal  o Throat  o :   § Oropharynx  § : no inflammation or lesions present  · Neck  o Inspection/Palpation  o : normal appearance, no masses or tenderness, trachea midline  o Thyroid  o : gland size normal, nontender  · Respiratory  o Respiratory Effort  o : breathing unlabored  o Auscultation of Lungs  o : normal breath sounds throughout inspiration and expiration  · Cardiovascular  o Heart  o :   § Auscultation of Heart  § : regular rate and rhythm, no murmurs  o Peripheral Vascular System  o :   § Carotid Arteries  § : no bruits present  § Extremities  § : no clubbing or edema  · Gastrointestinal  o Abdominal Examination  o : abdomen nontender to palpation, bowel sounds present  · Lymphatic  o Neck  o : no lymphadenopathy present  · Skin and Subcutaneous Tissue  o General Inspection  o : pink, warm, dry   · Neurologic  o Mental Status Examination  o :   § Orientation  § : grossly oriented to person, place and time  o Gait and Station  o : normal gait, able to stand without difficulty  · Psychiatric  o Judgement and Insight  o : judgment and insight intact  o Mood and Affect  o : mood normal, affect appropriate          Results  · In-Office Procedures  o Lab procedure  § IOP - Influenza A/B Test  (29868)   § Influenza A: Negative   § Influenza B: Negative   § Internal Control Verified?: Yes       Assessment  · COPD (chronic obstructive pulmonary disease)     496/J44.9  stable at this time  · Cough     786.2/R05  · Type 2 diabetes mellitus with other specified complication, without long-term current use of insulin     250.80/E11.69  currently controlled   · Essential hypertension     401.9/I10  currently controlled  · Exposure to COVID-19 virus     V01.79/Z20.828  · Hyperlipidemia     272.4/E78.5  LDL below goal stable on statin   · CAD (coronary artery disease)     414.00/I25.10      Plan  · Orders  o Diabetes 2 Panel (Urine Microalbumin, CMP, Lipid, A1c, ) [facility] (02212, 88367, 51573, 61649) - - 06/09/2021  o CBC with Auto Diff [facility] (02442) - - 06/09/2021  o Urinalysis with Reflex Microscopy ([facility]) (64290) - - 06/09/2021  o Thyroid Profile (41392, 80391, THYII) - - 06/09/2021  o Diabetic Dilated Eye Exam Consult with Ophthalmology/Optometry (DMEYE) - - 12/09/2020  o [facility] NCOV2 (send-out) (55986) - V01.79/Z20.828 - 12/09/2020  o ACO-39: Current medications updated and reviewed (1159F, ) - - 12/09/2020  o DEXA Bone Density, 1 or more sites, axial skeleton [facility] (25549) - - 12/09/2020  · Medications  o Detrol LA 2 mg oral capsule,extended release 24hr   SIG: take 1 capsule (2 mg) by oral route once daily for 90 days   DISP: (90) Capsule with 1 refills  Refilled on 12/09/2020     o losartan 25 mg oral tablet   SIG: TAKE 1 TABLET BY MOUTH ONCE DAILY FOR 90 DAYS for 90 days   DISP: (90) Tablet with 1 refills  Refilled on 12/09/2020     o metformin 500 mg oral tablet extended release 24 hr   SIG: TAKE 1 TABLET BY MOUTH ONCE DAILY WITH EVENING MEAL FOR 90 DAYS for 90 days   DISP: (90) Tablet with 1 refills  Refilled on 12/09/2020     o metoprolol tartrate 100 mg oral tablet   SIG: TAKE 1 TABLET BY MOUTH TWICE DAILY FOR 90 DAYS for 90 days   DISP: (180) Tablet with 1 refills  Refilled on 12/09/2020      o omeprazole 40 mg oral capsule,delayed release(DR/EC)   SIG: TAKE 1 CAPSULE BY MOUTH ONCE DAILY BEFORE A MEAL FOR 90 DAYS   DISP: (90) Capsule with 1 refills  Refilled on 12/09/2020     o rosuvastatin 20 mg oral tablet   SIG: TAKE 1 TABLET BY MOUTH ONCE DAILY FOR 90 DAYS for 90 days   DISP: (90) Tablet with 1 refills  Refilled on 12/09/2020     o spironolactone 25 mg oral tablet   SIG: TAKE 1 TABLET BY MOUTH TWICE DAILY FOR 90 DAYS for 90 days   DISP: (90) Tablet with 1 refills  Refilled on 12/09/2020     · Instructions  o Patient was educated/instructed on their diagnosis, treatment and medications prior to discharge from the clinic today.  o Discussed Covid-19 precautions including, but not limited to, social distancing, avoid touching your face, and hand washing.   · Disposition  o Follow Up in Office in 6 months or sooner if needed  o obtain labs prior to follow up appt  o will contact with diagnostics results            Electronically Signed by: BRIAN Pierson -Author on December 14, 2020 11:26:55 AM

## 2021-05-13 NOTE — PROGRESS NOTES
Progress Note      Patient Name: Maci Ruvalcaba   Patient ID: [id number]   Sex: Female   YOB: 1950    Primary Care Provider: Dionisio TORRES   Referring Provider: Hector Fatima    Visit Date: October 7, 2020    Provider: BRIAN Pierson   Location: [facility]   Location Address: [address] [place] [address]  [id number]   Location Phone: [phone]          Chief Complaint  · follow up- sleep apnea      History Of Present Illness  Maci Ruvalcaba is a 69 year old /White female who presents for evaluation and treatment of:      follow up- sleep apnea        c/o- Cpap mask leaking, wanting to try smaller/different mask.   Cpap supplies origionally from Dr Dorado in [place].  last sleep study 2015  Pt states that she went to [place] to see about getting a new mask and they stated that she needed to see her PCP for orders             Past Medical History  Disease Name Date Onset Notes   Allergies --  --    Arthritis --  --    Asthma --  --    CAD (coronary artery disease) 08/24/2018 --    COPD --  --    Dermatochalasis --  --    Diabetes --  --    Diabetes mellitus type 1 02/22/2017 --    Diverticulitis --  --    GERD --  --    Heart Attack --  --    Hernia --  --    Hyperlipemia --  --    Hyperlipidemia --  --    Hypertension --  --    Limb Swelling --  --    Lipoma --  --    Migraine --  --    Mitral valve prolapse --  --    Reflux --  --    Rheumatic Fever --  --    Seasonal allergies --  --    Shortness of Breath --  --    Sinus trouble --  --    Smoker --  --    Ulcer --  --          Past Surgical History  Procedure Name Date Notes   Ablation --  --    Back surgery --  Spinal fusion   Colonoscopy --  --    EAR Surgery --  --    EGD --  --    Eye Implant --  yes   Gallbladder --  --    heart surgery --  --    Hernia --  --    Hip Replacement --  --    Hysterectomy --  --    Tonsillectomy --  --          Medication  List  Name Date Started Instructions   Accu-Chek Eusebia Plus Meter miscellaneous misc 11/12/2018 DX E10.9 use as directed   Accu-Chek Eusebia Plus test strp miscellaneous strip 11/12/2018 DX E10.9 use as directed   aspirin 81 mg oral tablet,chewable  chew 1 tablet (81 mg) by oral route once daily   Calcium 500 + D 500 mg(1,250mg) -200 unit oral tablet  take 1 tablet by oral route daily   Claritin 10 mg oral tablet 10/07/2020 take 1 tablet (10 mg) by oral route once daily for 90 days   Custom Orthortics 03/27/2019 Use as instructed.   Detrol LA 2 mg oral capsule,extended release 24hr 06/09/2020 take 1 capsule (2 mg) by oral route once daily for 90 days   EQ LORATADINE 10MG TAB 05/14/2020 TAKE 1 TABLET BY MOUTH ONCE DAILY FOR 90 DAYS   Flovent Diskus 100 mcg/actuation inhalation blister with device 06/09/2020 inhale 1 puff (100 mcg) by inhalation route 2 times per day for 90 days   fluticasone propionate 50 mcg/actuation nasal spray,suspension 06/09/2020 spray 2 sprays (100 mcg) in each nostril by intranasal route once daily as needed for 90 days   glucometer 10/31/2018 dx dm2, sig daily and prn   hydrocodone-acetaminophen 5-325 mg oral tablet 08/13/2020 take 1 tablet by oral route every 6 hours as needed for pain   lancets miscellaneous misc 09/04/2019 DX E10.9 use as directed   losartan 25 mg oral tablet 06/09/2020 TAKE 1 TABLET BY MOUTH ONCE DAILY FOR 90 DAYS   melatonin 2.5 mg oral tablet,chewable  chew 1 tablet by oral route daily as needed   metformin 500 mg oral tablet extended release 24 hr 06/09/2020 TAKE 1 TABLET BY MOUTH ONCE DAILY WITH EVENING MEAL FOR 90 DAYS   metoprolol tartrate 100 mg oral tablet 06/09/2020 TAKE 1 TABLET BY MOUTH TWICE DAILY FOR 90 DAYS   Nitrostat 0.4 mg sublingual tablet, sublingual 07/16/2020 1 tablet (0.4 mg) at the first sign of an attack; no more than 3 tabs are recommended within a 15 minute period   omeprazole 40 mg oral capsule,delayed release(DR/EC) 01/23/2020 TAKE 1 CAPSULE BY  MOUTH ONCE DAILY BEFORE A MEAL FOR 90 DAYS   potassium 99 mg oral tablet  take 1 tablet by oral route daily as needed   Probiotic 10 billion cell oral capsule  take 1 capsule by oral route daily   rosuvastatin 20 mg oral tablet 2020 TAKE 1 TABLET BY MOUTH ONCE DAILY FOR 90 DAYS   spironolactone 25 mg oral tablet 2020 TAKE 1 TABLET BY MOUTH TWICE DAILY FOR 90 DAYS   tramadol 50 mg oral tablet  take 1 tablet (50 mg) by oral route every 6 hours as needed   Ventolin HFA 90 mcg/actuation inhalation HFA aerosol inhaler 2020 inhale 2 puffs (180 mcg) by inhalation route every 6 hours as needed for 90 days   Vitamin D3 1,000 unit oral tablet  take 1 tablet by oral route 2 times a day         Allergy List  Allergen Name Date Reaction Notes   iodine --  --  --        Allergies Reconciled  Family Medical History  Disease Name Relative/Age Notes   Cancer, Unspecified Father/  Mother/   Mother; Father   Lung cancer Father/  Mother/   --          Reproductive History  Menstrual   Pregnancy Summary   Total Pregnancies: 2 Full Term: 2 Premature: 0   Ab Induced: 0 Ab Spontaneous: 0 Ectopics: 0   Multiples: 0 Livin         Social History  Finding Status Start/Stop Quantity Notes   Alcohol Current some day --/-- rarely 2020 -    Alcohol Use Current some day --/-- --  occasionally drinks   lives alone --  --/-- --  --    lives with other --  --/-- --  --    Recreational Drug Use Never --/-- --  no   Retired. --  --/-- --  --    Tobacco Former  1.5ppd former smoker  1 1/2 ppd x 40 years    --  --/-- --  --          Immunizations  NameDate Admin Mfg Trade Name Lot Number Route Inj VIS Given VIS Publication   [id number] [facility] Fluzone Quadrivalent [id number] IM RD 10/07/2020 08/15/2019   Comments: ndc [id number]   Prevnar [id number] [facility] PREVNAR 13 [id number] IM RD 2017   Comments: tolerated well         Review of Systems  · Constitutional  o Denies  o : fever,  "chills  · Eyes  o Denies  o : discharge from eye  · HENT  o Denies  o : postnasal drainage, mouth lesions  · Cardiovascular  o Denies  o : chest Pain  · Respiratory  o Denies  o : frequent cough, shortness of breath  · Allergic-Immunologic  o Admits  o : seasonal allergies  o Denies  o : eczema, urticaria      Vitals  Date Time BP Position Site L\R Cuff Size HR RR TEMP (F) WT  HT  BMI kg/m2 BSA m2 O2 Sat FR L/min FiO2 HC       10/07/2020 01:48 /55 Sitting    56 - R 18 97.7 216lbs 6oz 5'  6\" 34.92 2.14 96 %  21%          Physical Examination  · Constitutional  o Appearance  o : well-nourished, in no acute distress  · Respiratory  o Respiratory Effort  o : breathing unlabored  o Auscultation of Lungs  o : normal breath sounds throughout inspiration and expiration  · Cardiovascular  o Heart  o :   § Auscultation of Heart  § : regular rate and rhythm, no murmurs  · Skin and Subcutaneous Tissue  o General Inspection  o : pink, warm, dry   · Neurologic  o Mental Status Examination  o :   § Orientation  § : grossly oriented to person, place and time  o Gait and Station  o : normal gait, able to stand without difficulty          Assessment  · Need for influenza vaccination     V04.81/Z23  · COPD (chronic obstructive pulmonary disease)     496/J44.9  · Sleep apnea     780.57/G47.30      Plan  · Orders  o Immunization Admin Fee (Single) ([facility]) (13603) - V04.81/Z23 - 10/07/2020  o Fluzone Quadrivalent Vaccine, age 6 months + (02466) - V04.81/Z23 - 10/07/2020   Vaccine - Influenza; Dose: 0.5; Site: Right Deltoid; Route: Intramuscular; Date: 10/07/2020 15:11:00; Exp: 06/30/2021; Lot: [id number]; Mfg: sanofi pasteur; TradeName: Fluzone Quadrivalent; Administered By: Mary Anne Arora MA; Comment: ndc [id number]  o ACO-39: Current medications updated and reviewed (1159F, ) - - 10/07/2020  o ACO-14: Influenza immunization administered or previously received [facility] () - - 10/07/2020  o Sleep Disorder Clinic Consultation " (SLEEP) - 780.57/G47.30 - 10/07/2020  · Medications  o CPAP supplies   SIG: DX SLEEP APNEA SIG USE AS DIRECTED   DISP: (1) 1 with 0 refills  Prescribed on 10/07/2020     o Medications have been Reconciled  o Transition of Care or Provider Policy  · Instructions  o Patient was educated/instructed on their diagnosis, treatment and medications prior to discharge from the clinic today.  · Disposition  o Follow up in office in 2 months            Electronically Signed by: BRIAN Pierson -Author on October 8, 2020 11:54:15 AM

## 2021-05-13 NOTE — PROGRESS NOTES
"   Progress Note      Patient Name: Maci Ruvalcaba   Patient ID: [id number]   Sex: Female   YOB: 1950    Primary Care Provider: Dionisio TORRES   Referring Provider: Hector Fatima    Visit Date: November 19, 2020    Provider: BRIAN Pierson   Location: [facility]   Location Address: [address] [place] [address]  [id number]   Location Phone: [phone]          Chief Complaint  · Follow up HTN, DM2, COPD, hyperlipidemia, and low back pain       History Of Present Illness  Maci Ruvalcaba is a 70 year old /White female who presents for evaluation and treatment of:      Follow up HTN, DM2, COPD, hyperlipidemia, and back pain         pt c/o chronic back pain with pelvic unleveling  dr Carlos ramos   c/o- pt was sent back to PCP  from chiropractor to have Xrays done, said that insurance wouldn't cover it if he ordered it, but insurance will cover it if PCP orders it .   had right hip replacement 2014 in [place].  since the replacement the left leg has been shorter than the right    pt c/o bruising in bilateral arms/hands  also feeling more tired     also pt  c/o heart \"shivering\"   history of ablation for SVT currently taking metoprolol   cardiology dr hallman last seen 7.2020 follow up in 6 months     also c/o always being short of breath     also c/o depression, states last taken in 2005, states she likes to cry a lot  pt denies si.hi  would like medication states she does not want it to get worse   unsure of what she was taking at that time  feels like its been stuck in house due to covid 19    last eye exam unknown   last foot exam 6.2020 dr Alcala       Past Medical History  Disease Name Date Onset Notes   Allergies --  --    Arthritis --  --    Asthma --  --    CAD (coronary artery disease) 08/24/2018 --    COPD --  --    Dermatochalasis --  --    Diabetes --  --    Diabetes mellitus type 1 02/22/2017 --  "   Diverticulitis --  --    GERD --  --    Heart Attack --  --    Hernia --  --    Hyperlipemia --  --    Hyperlipidemia --  --    Hypertension --  --    Limb Swelling --  --    Lipoma --  --    Migraine --  --    Mitral valve prolapse --  --    Reflux --  --    Rheumatic Fever --  --    Seasonal allergies --  --    Shortness of Breath --  --    Sinus trouble --  --    Smoker --  --    Ulcer --  --          Past Surgical History  Procedure Name Date Notes   Ablation --  --    Back surgery --  Spinal fusion   Blepharoplasty of both upper eyelids 08/18/2020 --    Colonoscopy --  --    EAR Surgery --  --    EGD --  --    Eye Implant --  yes   Gallbladder --  --    heart surgery --  --    Hernia --  --    Hip Replacement --  --    Hysterectomy --  --    Tonsillectomy --  --          Medication List  Name Date Started Instructions   Accu-Chek Eusebia Plus Meter miscellaneous misc 11/12/2018 DX E10.9 use as directed   Accu-Chek Eusebia Plus test strp miscellaneous strip 11/12/2018 DX E10.9 use as directed   aspirin 81 mg oral tablet,chewable  chew 1 tablet (81 mg) by oral route once daily   Calcium 500 + D 500 mg(1,250mg) -200 unit oral tablet  take 1 tablet by oral route daily   Claritin 10 mg oral tablet 10/07/2020 take 1 tablet (10 mg) by oral route once daily for 90 days   CPAP supplies 10/07/2020 DX SLEEP APNEA SIG USE AS DIRECTED   Custom Orthortics 03/27/2019 Use as instructed.   Detrol LA 2 mg oral capsule,extended release 24hr 06/09/2020 take 1 capsule (2 mg) by oral route once daily for 90 days   EQ LORATADINE 10MG TAB 05/14/2020 TAKE 1 TABLET BY MOUTH ONCE DAILY FOR 90 DAYS   ferrous sulfate 325 mg (65 mg iron) oral tablet 11/20/2020 take 1 tablet (325 mg) by oral route once daily for 90 days   Flovent Diskus 100 mcg/actuation inhalation blister with device 06/09/2020 inhale 1 puff (100 mcg) by inhalation route 2 times per day for 90 days   fluticasone propionate 50 mcg/actuation nasal spray,suspension 06/09/2020  spray 2 sprays (100 mcg) in each nostril by intranasal route once daily as needed for 90 days   glucometer 10/31/2018 dx dm2, sig daily and prn   lancets miscellaneous misc 09/04/2019 DX E10.9 use as directed   losartan 25 mg oral tablet 06/09/2020 TAKE 1 TABLET BY MOUTH ONCE DAILY FOR 90 DAYS   melatonin 2.5 mg oral tablet,chewable  chew 1 tablet by oral route daily as needed   metformin 500 mg oral tablet extended release 24 hr 06/09/2020 TAKE 1 TABLET BY MOUTH ONCE DAILY WITH EVENING MEAL FOR 90 DAYS   metoprolol tartrate 100 mg oral tablet 06/09/2020 TAKE 1 TABLET BY MOUTH TWICE DAILY FOR 90 DAYS   Nitrostat 0.4 mg sublingual tablet, sublingual 07/16/2020 1 tablet (0.4 mg) at the first sign of an attack; no more than 3 tabs are recommended within a 15 minute period   omeprazole 40 mg oral capsule,delayed release(DR/EC) 01/23/2020 TAKE 1 CAPSULE BY MOUTH ONCE DAILY BEFORE A MEAL FOR 90 DAYS   potassium 99 mg oral tablet  take 1 tablet by oral route daily as needed   Probiotic 10 billion cell oral capsule  take 1 capsule by oral route daily   rosuvastatin 20 mg oral tablet 06/09/2020 TAKE 1 TABLET BY MOUTH ONCE DAILY FOR 90 DAYS   spironolactone 25 mg oral tablet 06/09/2020 TAKE 1 TABLET BY MOUTH TWICE DAILY FOR 90 DAYS   tramadol 50 mg oral tablet  take 1 tablet (50 mg) by oral route every 6 hours as needed   Ventolin HFA 90 mcg/actuation inhalation HFA aerosol inhaler 06/09/2020 inhale 2 puffs (180 mcg) by inhalation route every 6 hours as needed for 90 days   Vitamin D3 1,000 unit oral tablet  take 1 tablet by oral route 2 times a day         Allergy List  Allergen Name Date Reaction Notes   iodine --  --  --          Family Medical History  Disease Name Relative/Age Notes   Cancer, Unspecified Father/  Mother/   Mother; Father   Lung cancer Father/  Mother/   --          Reproductive History  Menstrual   Pregnancy Summary   Total Pregnancies: 2 Full Term: 2 Premature: 0   Ab Induced: 0 Ab Spontaneous: 0  "Ectopics: 0   Multiples: 0 Livin         Social History  Finding Status Start/Stop Quantity Notes   Alcohol Current some day --/-- rarely 2020 -    Alcohol Use Current some day --/-- --  occasionally drinks   lives alone --  --/-- --  --    lives with other --  --/-- --  --    Recreational Drug Use Never --/-- --  no   Retired. --  --/-- --  --    Tobacco Former  1.5ppd former smoker  1  ppd x 40 years    --  --/-- --  --          Immunizations  NameDate Admin Mfg Trade Name Lot Number Route Inj VIS Given VIS Publication   [id number]/2020 [facility] Fluzone Quadrivalent [id number] IM RD 10/07/2020 08/15/2019   Comments: ndc [id number]   Prevnar [id number] [facility] PREVNAR 13 [id number] IM RD 2017   Comments: tolerated well         Review of Systems  · Constitutional  o Admits  o : fatigue  o Denies  o : fever, chills  · Eyes  o Denies  o : changes in vision  · HENT  o Denies  o : ear pain, sore throat  · Cardiovascular  o Admits  o : palpitations  o Denies  o : chest Pain, edema (swelling)  · Respiratory  o Admits  o : shortness of breath  o Denies  o : frequent cough  · Gastrointestinal  o Denies  o : abdominal pain, nausea, vomiting, constipation, diarrhea  · Genitourinary  o Denies  o : dysuria  · Neurologic  o Denies  o : headache, dizziness  · Musculoskeletal  o Admits  o : back pain  · Endocrine  o Denies  o : polydipsia, polyphagia  · Psychiatric  o Admits  o : depression  o Denies  o : SI/HI  · Heme-Lymph  o Admits  o : easy bruising  o Denies  o : easy bleeding, lymph node enlargement or tenderness      Vitals  Date Time BP Position Site L\R Cuff Size HR RR TEMP (F) WT  HT  BMI kg/m2 BSA m2 O2 Sat FR L/min FiO2 HC       10/07/2020 01:48 /55 Sitting    56 - R 18 97.7 216lbs 6oz 5'  6\" 34.92 2.14 96 %  21%    2020 11:34 /80 Sitting    68 - R  97     94 %  21%    2020 01:55 /68 Sitting    64 - R 24 97.3 216lbs 2oz 5'  6\" 34.88 2.14 92 %    "         Physical Examination  · Constitutional  o Appearance  o : well-nourished, in no acute distress  · Eyes  o Conjunctivae  o : conjunctivae normal  o Sclerae  o : sclerae white  o Pupils and Irises  o : pupils equal and round  o Eyelids/Ocular Adnexae  o : eyelid appearance normal, no exudates present  · Ears, Nose, Mouth and Throat  o Ears  o :   § External Ears  § : external auditory canal appearance within normal limits, no discharge present  § Otoscopic Examination  § : tympanic membrane appearance within normal limits bilaterally  o Nose  o :   § External Nose  § : appearance normal  § Intranasal Exam  § : mucosa within normal limits  § Nasopharynx  § : no discharge present  o Oral Cavity  o :   § Oral Mucosa  § : oral mucosa normal  o Throat  o :   § Oropharynx  § : no inflammation or lesions present  · Neck  o Inspection/Palpation  o : normal appearance, no masses or tenderness, trachea midline  o Thyroid  o : gland size normal, nontender  · Respiratory  o Respiratory Effort  o : breathing unlabored  o Auscultation of Lungs  o : normal breath sounds throughout inspiration and expiration  · Cardiovascular  o Heart  o :   § Auscultation of Heart  § : regular rate and rhythm, no murmurs  o Peripheral Vascular System  o :   § Carotid Arteries  § : no bruits present  § Extremities  § : no clubbing or edema  · Gastrointestinal  o Abdominal Examination  o : abdomen nontender to palpation, bowel sounds present   · Lymphatic  o Neck  o : no lymphadenopathy present  · Skin and Subcutaneous Tissue  o General Inspection  o : pink, warm, dry   · Neurologic  o Mental Status Examination  o :   § Orientation  § : grossly oriented to person, place and time  o Gait and Station  o : normal gait, able to stand without difficulty  · Psychiatric  o Judgement and Insight  o : judgment and insight intact  o Mood and Affect  o : mood normal, affect appropriate          Assessment  · COPD (chronic obstructive pulmonary  disease)     496/J44.9  stable at this time   · Depression     311/F32.9  will try Zoloft   · Type 2 diabetes mellitus with other specified complication, without long-term current use of insulin     250.80/E11.69  · Essential hypertension     401.9/I10  currently controlled   · Fatigue     780.79/R53.83  · Hyperlipidemia     272.4/E78.5  will obtain lipid panel   · CAD (coronary artery disease)     414.00/I25.10  · Mitral valve prolapse     424.0/I34.1  · Chronic low back pain       Low back pain     724.2/M54.5  Other chronic pain     724.2/G89.29  · Palpitations     785.1/R00.2  · Dyspnea     786.09/R06.00  · Bilateral Hip pain     719.45/M25.559  · Bruising     924.9/T14.8XXA  · Hematuria     599.70/R31.9      Plan  · Orders  o Diabetes 2 Panel (Urine Microalbumin, CMP, Lipid, A1c, ) [facility] (39343, 71931, 44017, 79421) - - 11/19/2020  o CBC with Auto Diff [facility] (00964) - - 11/19/2020  o Urinalysis with Reflex Microscopy ([facility]) (39693) - - 11/19/2020  o Thyroid Profile (36951, 73403, THYII) - - 11/19/2020  o Iron panel (iron, TIBC, transferrin saturation) (41545, 48606, 05634) - 780.79/R53.83, 924.9/T14.8XXA - 11/19/2020  o B12 Folate levels (B12FO) - 780.79/R53.83 - 11/19/2020  o Urine culture (51360, 59663) - 599.70/R31.9 - 11/19/2020  o ACO-39: Current medications updated and reviewed (, 1159F) - - 11/19/2020  o Xray of lumbar spine AP and Lateral [facility] (02821) - 724.2/M54.5 - 11/19/2020  o Xray pelvis 3 or more views [facility] (44833) - 724.2/M54.5, 724.2/G89.29, 719.45/M25.559 - 11/19/2020  o BNP (brain natriuretic peptide measurement) (64007) - 785.1/R00.2, 786.09/R06.00, 401.9/I10 - 11/19/2020  o EKG with at least 12 leads, INTERPRETATION AND REPORT ONLY [facility] (90956) - - 11/19/2020  o Chest (AP PA and Lateral) 2 views X-Ray [facility] Preferred View (12976) - 496/J44.9 - 11/19/2020  · Medications  o Zoloft 25 mg oral tablet   SIG: take 1 tablet (25 mg) by oral route once daily for 30 days   DISP: (30) Tablet with 1  refills  Prescribed on 11/19/2020     o Medications have been Reconciled  o Transition of Care or Provider Policy  · Instructions  o Patient was educated/instructed on their diagnosis, treatment and medications prior to discharge from the clinic today.  · Disposition  o will contact with diagnostics results  o follow up 2 weeks            Electronically Signed by: Dionisio Lanier APRN -Author on November 20, 2020 01:53:49 PM

## 2021-05-14 VITALS
SYSTOLIC BLOOD PRESSURE: 132 MMHG | OXYGEN SATURATION: 96 % | DIASTOLIC BLOOD PRESSURE: 55 MMHG | WEIGHT: 216.37 LBS | BODY MASS INDEX: 34.77 KG/M2 | HEART RATE: 56 BPM | RESPIRATION RATE: 18 BRPM | TEMPERATURE: 97.7 F | HEIGHT: 66 IN

## 2021-05-14 VITALS
DIASTOLIC BLOOD PRESSURE: 67 MMHG | BODY MASS INDEX: 34.55 KG/M2 | WEIGHT: 215 LBS | OXYGEN SATURATION: 96 % | RESPIRATION RATE: 20 BRPM | SYSTOLIC BLOOD PRESSURE: 115 MMHG | TEMPERATURE: 98 F | HEIGHT: 66 IN | HEART RATE: 58 BPM

## 2021-05-14 VITALS — BODY MASS INDEX: 34.43 KG/M2 | WEIGHT: 214.25 LBS | RESPIRATION RATE: 14 BRPM | HEIGHT: 66 IN

## 2021-05-14 VITALS
SYSTOLIC BLOOD PRESSURE: 124 MMHG | OXYGEN SATURATION: 94 % | HEART RATE: 62 BPM | DIASTOLIC BLOOD PRESSURE: 87 MMHG | TEMPERATURE: 96.6 F

## 2021-05-14 VITALS — HEIGHT: 66 IN | TEMPERATURE: 97.1 F | WEIGHT: 214.31 LBS | BODY MASS INDEX: 34.44 KG/M2

## 2021-05-14 VITALS
WEIGHT: 216.12 LBS | OXYGEN SATURATION: 92 % | DIASTOLIC BLOOD PRESSURE: 80 MMHG | OXYGEN SATURATION: 94 % | DIASTOLIC BLOOD PRESSURE: 68 MMHG | SYSTOLIC BLOOD PRESSURE: 127 MMHG | RESPIRATION RATE: 24 BRPM | TEMPERATURE: 97.3 F | TEMPERATURE: 97 F | HEART RATE: 64 BPM | BODY MASS INDEX: 34.73 KG/M2 | HEIGHT: 66 IN | SYSTOLIC BLOOD PRESSURE: 119 MMHG | HEART RATE: 68 BPM

## 2021-05-14 VITALS
BODY MASS INDEX: 34.91 KG/M2 | SYSTOLIC BLOOD PRESSURE: 145 MMHG | HEART RATE: 60 BPM | DIASTOLIC BLOOD PRESSURE: 71 MMHG | WEIGHT: 217.25 LBS | HEIGHT: 66 IN

## 2021-05-14 VITALS
TEMPERATURE: 96.6 F | DIASTOLIC BLOOD PRESSURE: 84 MMHG | HEART RATE: 65 BPM | OXYGEN SATURATION: 95 % | SYSTOLIC BLOOD PRESSURE: 129 MMHG

## 2021-05-14 VITALS — WEIGHT: 5.38 LBS | HEART RATE: 60 BPM | DIASTOLIC BLOOD PRESSURE: 88 MMHG | SYSTOLIC BLOOD PRESSURE: 134 MMHG

## 2021-05-14 NOTE — PROGRESS NOTES
Progress Note      Patient Name: Maci Ruvalcaba   Patient ID: [id number]   Sex: Female   YOB: 1950    Primary Care Provider: Dionisio TORRES   Referring Provider: Sav Velarde MD    Visit Date: February 9, 2021    Provider: Braxton Ramirez MD   Location: [facility]   Location Address: [address] [place] [address], [address]   [place]  [id number]   Location Phone: [phone]          Chief Complaint     Palpitations and supraventricular tachycardia.       History Of Present Illness  Maci Ruvalcaba is a 70 year old /White female with a history of palpitations, coronary artery disease. No further palpitations. No syncope or presyncopal episode. Recent stress test was negative.   CURRENT MEDICATIONS: Medications have been reviewed and are as stated.   PAST MEDICAL HISTORY: Chronic obstructive pulmonary disease; coronary artery disease; diabetes mellitus; hyperlipidemia; hypertension; supraventricular tachycardia.   PSYCHOSOCIAL HISTORY: Rarely uses alcohol. Previous use of tobacco, but quit.      ALLERGIES: Iodine; eyedrops.       Review of Systems  · Cardiovascular  o Admits  o : palpitations (fast, fluttering, or skipping beats), shortness of breath while walking or lying flat  o Denies  o : swelling (feet, ankles, hands), chest pain or angina pectoris   · Respiratory  o Admits  o : chronic or frequent cough      Vitals  Date Time BP Position Site L\R Cuff Size HR RR TEMP (F) WT  HT  BMI kg/m2 BSA m2 O2 Sat FR L/min FiO2 HC       02/09/2021 10:30 /88 Sitting    60 - R   5lbs 6oz                Physical Examination  · Constitutional  o Appearance  o : Awake, alert, cooperative, pleasant.  · Respiratory  o Inspection of Chest  o : No chest wall deformities, moving equal.  o Auscultation of Lungs  o : Good air entry with vesicular breath sounds. Clear.  · Cardiovascular  o Heart  o :   § Auscultation of Heart  § : S1 and S2 regular. No S3. No S4. No murmurs.  No JVD.  o Peripheral Vascular System  o :   § Extremities  § : Peripheral pulses were well felt. No edema. No cyanosis.  · Gastrointestinal  o Abdominal Examination  o : No masses or organomegaly noted.  · EKG  o EKG  o : Was performed in the office today.  o Indications  o : Coronary artery disease.  o Results  o : Showed sinus rhythm within normal limits.           Assessment     1.  Palpitations, history of supraventricular tachycardia, stable. Continue current dose of metoprolol.   2.  Coronary artery disease, status post PTCA/stent, stable. Continue current dose of aspirin.  3.  COPD, stable. Continue current bronchodilators.     FOLLOW-UP:   See me back in 6 months.        Braxton Ramirez MD  MAUREEN/vh                  Electronically Signed by: Cira Zuleta-, OT -Author on February 24, 2021 09:43:41 AM  Electronically Co-signed by: Braxton Ramirez MD -Reviewer on March 15, 2021 12:03:11 PM

## 2021-05-15 VITALS — RESPIRATION RATE: 16 BRPM | WEIGHT: 210.31 LBS | HEIGHT: 66 IN | BODY MASS INDEX: 33.8 KG/M2

## 2021-05-15 VITALS
HEIGHT: 66 IN | BODY MASS INDEX: 34.39 KG/M2 | SYSTOLIC BLOOD PRESSURE: 120 MMHG | DIASTOLIC BLOOD PRESSURE: 70 MMHG | WEIGHT: 214 LBS | HEART RATE: 56 BPM

## 2021-05-15 VITALS
DIASTOLIC BLOOD PRESSURE: 55 MMHG | SYSTOLIC BLOOD PRESSURE: 119 MMHG | OXYGEN SATURATION: 96 % | HEIGHT: 66 IN | WEIGHT: 220 LBS | BODY MASS INDEX: 35.36 KG/M2 | TEMPERATURE: 97.5 F | HEART RATE: 75 BPM

## 2021-05-15 VITALS
BODY MASS INDEX: 34.72 KG/M2 | SYSTOLIC BLOOD PRESSURE: 98 MMHG | HEART RATE: 60 BPM | DIASTOLIC BLOOD PRESSURE: 68 MMHG | WEIGHT: 216 LBS | HEIGHT: 66 IN

## 2021-05-15 VITALS
SYSTOLIC BLOOD PRESSURE: 109 MMHG | OXYGEN SATURATION: 96 % | TEMPERATURE: 97.1 F | HEART RATE: 65 BPM | DIASTOLIC BLOOD PRESSURE: 72 MMHG

## 2021-05-15 VITALS — HEIGHT: 66 IN | HEART RATE: 80 BPM | OXYGEN SATURATION: 97 %

## 2021-05-15 VITALS — WEIGHT: 213 LBS | RESPIRATION RATE: 18 BRPM | BODY MASS INDEX: 34.23 KG/M2 | HEIGHT: 66 IN

## 2021-05-15 VITALS
BODY MASS INDEX: 34.23 KG/M2 | RESPIRATION RATE: 18 BRPM | HEART RATE: 64 BPM | DIASTOLIC BLOOD PRESSURE: 69 MMHG | HEIGHT: 66 IN | SYSTOLIC BLOOD PRESSURE: 139 MMHG | WEIGHT: 213 LBS | OXYGEN SATURATION: 97 % | TEMPERATURE: 98 F

## 2021-05-15 VITALS
DIASTOLIC BLOOD PRESSURE: 73 MMHG | SYSTOLIC BLOOD PRESSURE: 138 MMHG | OXYGEN SATURATION: 97 % | BODY MASS INDEX: 35.58 KG/M2 | SYSTOLIC BLOOD PRESSURE: 141 MMHG | DIASTOLIC BLOOD PRESSURE: 82 MMHG | TEMPERATURE: 97.2 F | HEIGHT: 66 IN | WEIGHT: 221.37 LBS | HEART RATE: 63 BPM

## 2021-05-15 VITALS
WEIGHT: 219 LBS | DIASTOLIC BLOOD PRESSURE: 59 MMHG | HEART RATE: 77 BPM | SYSTOLIC BLOOD PRESSURE: 130 MMHG | HEIGHT: 66 IN | BODY MASS INDEX: 35.2 KG/M2 | TEMPERATURE: 97.8 F

## 2021-05-15 VITALS
HEART RATE: 61 BPM | OXYGEN SATURATION: 97 % | BODY MASS INDEX: 35.03 KG/M2 | HEIGHT: 66 IN | WEIGHT: 218 LBS | RESPIRATION RATE: 20 BRPM

## 2021-05-15 VITALS
SYSTOLIC BLOOD PRESSURE: 111 MMHG | BODY MASS INDEX: 35.2 KG/M2 | HEIGHT: 66 IN | OXYGEN SATURATION: 97 % | WEIGHT: 219 LBS | TEMPERATURE: 98 F | DIASTOLIC BLOOD PRESSURE: 59 MMHG | HEART RATE: 63 BPM

## 2021-05-15 VITALS
WEIGHT: 210 LBS | TEMPERATURE: 98.1 F | HEIGHT: 66 IN | DIASTOLIC BLOOD PRESSURE: 74 MMHG | RESPIRATION RATE: 19 BRPM | BODY MASS INDEX: 33.75 KG/M2 | OXYGEN SATURATION: 97 % | HEART RATE: 69 BPM | SYSTOLIC BLOOD PRESSURE: 127 MMHG

## 2021-05-15 VITALS — RESPIRATION RATE: 16 BRPM | HEIGHT: 66 IN | BODY MASS INDEX: 34.23 KG/M2 | WEIGHT: 213 LBS

## 2021-05-15 VITALS
SYSTOLIC BLOOD PRESSURE: 102 MMHG | HEIGHT: 66 IN | HEART RATE: 64 BPM | WEIGHT: 217 LBS | DIASTOLIC BLOOD PRESSURE: 82 MMHG | BODY MASS INDEX: 34.87 KG/M2

## 2021-05-15 VITALS
WEIGHT: 216 LBS | HEART RATE: 83 BPM | OXYGEN SATURATION: 98 % | BODY MASS INDEX: 34.72 KG/M2 | RESPIRATION RATE: 18 BRPM | HEIGHT: 66 IN | SYSTOLIC BLOOD PRESSURE: 122 MMHG | TEMPERATURE: 98 F | DIASTOLIC BLOOD PRESSURE: 71 MMHG

## 2021-05-15 VITALS
DIASTOLIC BLOOD PRESSURE: 71 MMHG | TEMPERATURE: 97.3 F | OXYGEN SATURATION: 96 % | WEIGHT: 215.12 LBS | HEIGHT: 66 IN | SYSTOLIC BLOOD PRESSURE: 136 MMHG | RESPIRATION RATE: 18 BRPM | HEART RATE: 84 BPM | BODY MASS INDEX: 34.57 KG/M2

## 2021-05-15 VITALS — HEIGHT: 66 IN | OXYGEN SATURATION: 94 % | HEART RATE: 79 BPM

## 2021-05-15 VITALS
HEIGHT: 66 IN | HEART RATE: 60 BPM | WEIGHT: 212 LBS | BODY MASS INDEX: 34.07 KG/M2 | OXYGEN SATURATION: 97 % | SYSTOLIC BLOOD PRESSURE: 114 MMHG | DIASTOLIC BLOOD PRESSURE: 58 MMHG

## 2021-05-15 VITALS — WEIGHT: 213 LBS | BODY MASS INDEX: 34.23 KG/M2 | RESPIRATION RATE: 16 BRPM | HEIGHT: 66 IN

## 2021-05-15 VITALS — OXYGEN SATURATION: 97 % | HEART RATE: 58 BPM | HEIGHT: 66 IN

## 2021-05-16 VITALS — RESPIRATION RATE: 16 BRPM | HEIGHT: 66 IN | BODY MASS INDEX: 33.75 KG/M2 | WEIGHT: 210 LBS

## 2021-05-16 VITALS
WEIGHT: 212 LBS | RESPIRATION RATE: 18 BRPM | HEART RATE: 57 BPM | SYSTOLIC BLOOD PRESSURE: 136 MMHG | BODY MASS INDEX: 34.07 KG/M2 | OXYGEN SATURATION: 98 % | TEMPERATURE: 98.6 F | DIASTOLIC BLOOD PRESSURE: 59 MMHG | HEIGHT: 66 IN

## 2021-05-16 VITALS
WEIGHT: 212 LBS | HEIGHT: 66 IN | RESPIRATION RATE: 16 BRPM | DIASTOLIC BLOOD PRESSURE: 63 MMHG | HEART RATE: 75 BPM | SYSTOLIC BLOOD PRESSURE: 117 MMHG | OXYGEN SATURATION: 96 % | BODY MASS INDEX: 34.07 KG/M2

## 2021-05-16 VITALS
OXYGEN SATURATION: 96 % | WEIGHT: 215 LBS | SYSTOLIC BLOOD PRESSURE: 129 MMHG | DIASTOLIC BLOOD PRESSURE: 77 MMHG | BODY MASS INDEX: 34.55 KG/M2 | RESPIRATION RATE: 19 BRPM | HEART RATE: 55 BPM | TEMPERATURE: 98.1 F | HEIGHT: 66 IN

## 2021-05-16 VITALS — BODY MASS INDEX: 33.75 KG/M2 | WEIGHT: 210 LBS | HEIGHT: 66 IN | RESPIRATION RATE: 16 BRPM

## 2021-05-16 VITALS
BODY MASS INDEX: 34.55 KG/M2 | DIASTOLIC BLOOD PRESSURE: 80 MMHG | HEIGHT: 66 IN | HEART RATE: 56 BPM | WEIGHT: 215 LBS | SYSTOLIC BLOOD PRESSURE: 124 MMHG

## 2021-05-16 VITALS
DIASTOLIC BLOOD PRESSURE: 82 MMHG | WEIGHT: 212 LBS | HEART RATE: 56 BPM | BODY MASS INDEX: 34.07 KG/M2 | SYSTOLIC BLOOD PRESSURE: 128 MMHG | HEIGHT: 66 IN

## 2021-05-19 ENCOUNTER — OFFICE VISIT CONVERTED (OUTPATIENT)
Dept: FAMILY MEDICINE CLINIC | Facility: CLINIC | Age: 71
End: 2021-05-19
Attending: NURSE PRACTITIONER

## 2021-05-19 ENCOUNTER — HOSPITAL ENCOUNTER (OUTPATIENT)
Dept: FAMILY MEDICINE CLINIC | Facility: CLINIC | Age: 71
Discharge: HOME | End: 2021-05-19
Attending: NURSE PRACTITIONER

## 2021-05-19 ENCOUNTER — HOSPITAL ENCOUNTER (OUTPATIENT)
Dept: OTHER | Facility: HOSPITAL | Age: 71
Discharge: HOME | End: 2021-05-19
Attending: NURSE PRACTITIONER

## 2021-05-19 LAB
ALBUMIN SERPL-MCNC: 3.9 G/DL (ref 3.5–5)
ALBUMIN SERPL-MCNC: 3.9 G/DL (ref 3.5–5)
ALBUMIN/GLOB SERPL: 1.2 {RATIO} (ref 1.4–2.6)
ALP SERPL-CCNC: 118 U/L (ref 43–160)
ALT SERPL-CCNC: 22 U/L (ref 10–40)
AMPHETAMINES UR QL SCN: NEGATIVE
ANION GAP SERPL CALC-SCNC: 11 MMOL/L (ref 8–19)
ANION GAP SERPL CALC-SCNC: 11 MMOL/L (ref 8–19)
APPEARANCE UR: CLEAR
AST SERPL-CCNC: 27 U/L (ref 15–50)
BARBITURATES UR QL SCN: NEGATIVE
BASOPHILS # BLD AUTO: 0.05 10*3/UL (ref 0–0.2)
BASOPHILS NFR BLD AUTO: 0.6 % (ref 0–3)
BENZODIAZ UR QL SCN: NEGATIVE
BILIRUB SERPL-MCNC: 0.38 MG/DL (ref 0.2–1.3)
BILIRUB UR QL: NEGATIVE
BUN SERPL-MCNC: 10 MG/DL (ref 5–25)
BUN SERPL-MCNC: 10 MG/DL (ref 5–25)
BUN/CREAT SERPL: 11 {RATIO} (ref 6–20)
BUN/CREAT SERPL: 11 {RATIO} (ref 6–20)
CALCIUM SERPL-MCNC: 9.2 MG/DL (ref 8.7–10.4)
CALCIUM SERPL-MCNC: 9.2 MG/DL (ref 8.7–10.4)
CHLORIDE SERPL-SCNC: 99 MMOL/L (ref 99–111)
CHLORIDE SERPL-SCNC: 99 MMOL/L (ref 99–111)
CHOLEST SERPL-MCNC: 147 MG/DL (ref 107–200)
CHOLEST/HDLC SERPL: 3.3 {RATIO} (ref 3–6)
COLOR UR: YELLOW
CONV ABS IMM GRAN: 0.03 10*3/UL (ref 0–0.2)
CONV BACTERIA: ABNORMAL
CONV CO2: 29 MMOL/L (ref 22–32)
CONV CO2: 29 MMOL/L (ref 22–32)
CONV COCAINE, UR: NEGATIVE
CONV COLLECTION SOURCE (UA): ABNORMAL
CONV CREATININE URINE, RANDOM: 63.5 MG/DL (ref 10–300)
CONV CREATININE URINE, RANDOM: 64.7 MG/DL (ref 10–300)
CONV IMMATURE GRAN: 0.4 % (ref 0–1.8)
CONV MICROALBUM.,U,RANDOM: <12 MG/L (ref 0–20)
CONV PROTEIN TO CREATININE RATIO (RANDOM URINE): 0.06 {RATIO} (ref 0–0.1)
CONV TOTAL PROTEIN: 7.2 G/DL (ref 6.3–8.2)
CONV UROBILINOGEN IN URINE BY AUTOMATED TEST STRIP: 0.2 {EHRLICHU}/DL (ref 0.1–1)
CREAT UR-MCNC: 0.93 MG/DL (ref 0.5–0.9)
CREAT UR-MCNC: 0.94 MG/DL (ref 0.5–0.9)
DEPRECATED RDW RBC AUTO: 42.5 FL (ref 36.4–46.3)
EOSINOPHIL # BLD AUTO: 0.16 10*3/UL (ref 0–0.7)
EOSINOPHIL # BLD AUTO: 2.1 % (ref 0–7)
ERYTHROCYTE [DISTWIDTH] IN BLOOD BY AUTOMATED COUNT: 12.9 % (ref 11.7–14.4)
EST. AVERAGE GLUCOSE BLD GHB EST-MCNC: 111 MG/DL
GFR SERPLBLD BASED ON 1.73 SQ M-ARVRAT: >60 ML/MIN/{1.73_M2}
GFR SERPLBLD BASED ON 1.73 SQ M-ARVRAT: >60 ML/MIN/{1.73_M2}
GLOBULIN UR ELPH-MCNC: 3.3 G/DL (ref 2–3.5)
GLUCOSE SERPL-MCNC: 89 MG/DL (ref 65–99)
GLUCOSE SERPL-MCNC: 90 MG/DL (ref 65–99)
GLUCOSE UR QL: NEGATIVE MG/DL
HBA1C MFR BLD: 5.5 % (ref 3.5–5.7)
HCT VFR BLD AUTO: 42.1 % (ref 37–47)
HDLC SERPL-MCNC: 44 MG/DL (ref 40–60)
HGB BLD-MCNC: 13.4 G/DL (ref 12–16)
HGB UR QL STRIP: ABNORMAL
KETONES UR QL STRIP: NEGATIVE MG/DL
LDLC SERPL CALC-MCNC: 57 MG/DL (ref 70–100)
LEUKOCYTE ESTERASE UR QL STRIP: ABNORMAL
LYMPHOCYTES # BLD AUTO: 1.94 10*3/UL (ref 1–5)
LYMPHOCYTES NFR BLD AUTO: 25.1 % (ref 20–45)
MCH RBC QN AUTO: 28.4 PG (ref 27–31)
MCHC RBC AUTO-ENTMCNC: 31.8 G/DL (ref 33–37)
MCV RBC AUTO: 89.2 FL (ref 81–99)
METHADONE UR QL SCN: NEGATIVE
MICROALBUMIN/CREAT UR: 18.9 MG/G{CRE} (ref 0–35)
MONOCYTES # BLD AUTO: 0.66 10*3/UL (ref 0.2–1.2)
MONOCYTES NFR BLD AUTO: 8.5 % (ref 3–10)
NEUTROPHILS # BLD AUTO: 4.89 10*3/UL (ref 2–8)
NEUTROPHILS NFR BLD AUTO: 63.3 % (ref 30–85)
NITRITE UR QL STRIP: NEGATIVE
NRBC CBCN: 0 % (ref 0–0.7)
OPIATES TESTED UR SCN: NEGATIVE
OSMOLALITY SERPL CALC.SUM OF ELEC: 279 MOSM/KG (ref 273–304)
OXYCODONE UR QL SCN: NEGATIVE
PCP UR QL: NEGATIVE
PH UR STRIP.AUTO: 6.5 [PH] (ref 5–8)
PHOSPHATE SERPL-MCNC: 3.8 MG/DL (ref 2.4–4.5)
PLATELET # BLD AUTO: 215 10*3/UL (ref 130–400)
PMV BLD AUTO: 9.4 FL (ref 9.4–12.3)
POTASSIUM SERPL-SCNC: 4.1 MMOL/L (ref 3.5–5.3)
POTASSIUM SERPL-SCNC: 4.1 MMOL/L (ref 3.5–5.3)
PROT UR QL: NEGATIVE MG/DL
PROT UR-MCNC: <4 MG/DL
RBC # BLD AUTO: 4.72 10*6/UL (ref 4.2–5.4)
RBC #/AREA URNS HPF: ABNORMAL /[HPF]
SODIUM SERPL-SCNC: 135 MMOL/L (ref 135–147)
SODIUM SERPL-SCNC: 135 MMOL/L (ref 135–147)
SP GR UR: 1.01 (ref 1–1.03)
SQUAMOUS SPT QL MICRO: ABNORMAL /[HPF]
T4 FREE SERPL-MCNC: 1.3 NG/DL (ref 0.9–1.8)
THC SERPLBLD CFM-MCNC: NEGATIVE NG/ML
TRIGL SERPL-MCNC: 232 MG/DL (ref 40–150)
TSH SERPL-ACNC: 1.21 M[IU]/L (ref 0.27–4.2)
VLDLC SERPL-MCNC: 46 MG/DL (ref 5–37)
WBC # BLD AUTO: 7.73 10*3/UL (ref 4.8–10.8)
WBC #/AREA URNS HPF: ABNORMAL /[HPF]

## 2021-05-22 ENCOUNTER — TRANSCRIBE ORDERS (OUTPATIENT)
Dept: ADMINISTRATIVE | Facility: HOSPITAL | Age: 71
End: 2021-05-22

## 2021-05-22 DIAGNOSIS — Z12.31 SCREENING MAMMOGRAM, ENCOUNTER FOR: Primary | ICD-10-CM

## 2021-05-22 DIAGNOSIS — Z78.0 POST-MENOPAUSAL: Primary | ICD-10-CM

## 2021-06-05 NOTE — PROGRESS NOTES
Progress Note      Patient Name: Maci Ruvalcaba   Patient ID: [id number]   Sex: Female   YOB: 1950    Primary Care Provider: Dionisio TORRES   Referring Provider: Dionisio TORRES    Visit Date: May 19, 2021    Provider: BRIAN Pierson   Location: [facility]   Location Address: [address] [place], [place] [id number]   Location Phone: [phone]          Chief Complaint  · 6 month follow up for Hypertension, DM2, COPD, hyperlipidemia, CAD, palpitations, and low back pain       History Of Present Illness  Maci Ruvalcaba is a 70 year old /White female who presents for evaluation and treatment of:      6 month follow up for Hypertension, DM2, COPD, hyperlipidemia, CAD, palpitations, and low back pain   lab orders  medication refills     c/o lump in right shoulder    pt c/o hot flashes then gets dizzy and feels weak- left arm gets weak   states episodes 3-4 per month started in February, states she talked with dr hallman nurse and informed this was not a heart related issue     eye: 03/2021 [facility]   foot: checking feet daily, trimming own toenails   mammo: 03/2020  DEXA: 12/2017  colonoscopy: 11/2017    pt c/o uncontrolled low back pain, requested referral to pain management, request a rx for tramadol until pain management appointment       Past Medical History  Disease Name Date Onset Notes   Allergic rhinitis, chronic --  --    Allergies --  --    Anemia, Unspecified --  --    Arthritis --  --    Asthma --  --    Bladder disorder --  --    CAD (coronary artery disease) 08/24/2018 --    Chest pain --  --    Chronic Obstructive Pulmonary Disease --  --    COPD --  --    Dermatochalasis --  --    Diabetes --  --    Diabetes mellitus type 1 02/22/2017 --    Diverticulitis --  --    GERD --  --    Heart Attack --  --    Heart Disease --  --    Hernia --  --    High blood pressure --  --     High cholesterol --  --    Hyperlipemia --  --    Hyperlipidemia --  --    Hypertension --  --    Limb Swelling --  --    Lipoma --  --    Migraine --  --    Mitral valve prolapse --  --    Reflux --  --    Rheumatic Fever --  --    Seasonal allergies --  --    Shortness Of Air --  --    Shortness of Breath --  --    Sinus trouble --  --    Smoker --  --    Ulcer --  --          Past Surgical History  Procedure Name Date Notes   *Metal Implant --  --    Ablation --  --    Appendectomy --  --    Back --  --    Back surgery --  Spinal fusion   Blepharoplasty of both upper eyelids 08/18/2020 --    Cardiac --  --    Colonoscopy --  --    EAR Surgery --  --    EGD --  --    Eye Implant --  yes   Gallbladder --  --    heart surgery --  --    Hernia --  --    Hip Replacement --  --    Hysterectomy --  --    Hysterectomy-Abdominal --  --    Tonsillectomy --  --          Medication List  Name Date Started Instructions   Accu-Chek Eusebia Plus Meter miscellaneous misc 11/12/2018 DX E10.9 use as directed   Accu-Chek Eusebia Plus test strp miscellaneous strip 11/12/2018 DX E10.9 use as directed   aspirin 81 mg oral tablet,chewable  chew 1 tablet (81 mg) by oral route once daily   Calcium 500 + D 500 mg(1,250mg) -200 unit oral tablet  take 1 tablet by oral route daily   Claritin 10 mg oral tablet 05/19/2021 take 1 tablet (10 mg) by oral route once daily for 90 days   Custom Orthortics 03/27/2019 Use as instructed.   Detrol LA 2 mg oral capsule,extended release 24hr 12/09/2020 take 1 capsule (2 mg) by oral route once daily for 90 days   ferrous sulfate 325 mg (65 mg iron) oral tablet 05/19/2021 take 1 tablet (325 mg) by oral route once daily for 90 days   Flovent Diskus 100 mcg/actuation inhalation blister with device 06/09/2020 inhale 1 puff (100 mcg) by inhalation route 2 times per day for 90 days   fluticasone propionate 50 mcg/actuation nasal spray,suspension 06/09/2020 spray 2 sprays (100 mcg) in each nostril by intranasal  route once daily as needed for 90 days   glucometer 10/31/2018 dx dm2, sig daily and prn   lancets miscellaneous misc 09/04/2019 DX E10.9 use as directed   losartan 25 mg oral tablet 05/19/2021 TAKE 1 TABLET BY MOUTH ONCE DAILY FOR 90 DAYS for 90 days   melatonin 2.5 mg oral tablet,chewable  chew 1 tablet by oral route daily as needed   metformin 500 mg oral tablet extended release 24 hr 05/19/2021 TAKE 1 TABLET BY MOUTH ONCE DAILY WITH EVENING MEAL FOR 90 DAYS for 90 days   metoprolol tartrate 100 mg oral tablet 05/19/2021 TAKE 1 TABLET BY MOUTH TWICE DAILY FOR 90 DAYS for 90 days   Nitrostat 0.4 mg sublingual tablet, sublingual 07/16/2020 1 tablet (0.4 mg) at the first sign of an attack; no more than 3 tabs are recommended within a 15 minute period   omeprazole 40 mg oral capsule,delayed release(DR/EC) 05/19/2021 TAKE 1 CAPSULE BY MOUTH ONCE DAILY BEFORE A MEAL FOR 90 DAYS   potassium 99 mg oral tablet  take 1 tablet by oral route daily as needed   Probiotic 10 billion cell oral capsule  take 1 capsule by oral route daily   rosuvastatin 20 mg oral tablet 05/19/2021 TAKE 1 TABLET BY MOUTH ONCE DAILY FOR 90 DAYS for 90 days   Sertraline HCl 25 MG Oral Tablet 05/11/2021 Take 1 tablet by mouth once daily for 30 days   spironolactone 25 mg oral tablet 05/19/2021 TAKE 1 TABLET BY MOUTH TWICE DAILY FOR 90 DAYS for 90 days   tramadol 50 mg oral tablet  take 1 tablet (50 mg) by oral route every 6 hours as needed   Ventolin HFA 90 mcg/actuation inhalation HFA aerosol inhaler 06/09/2020 inhale 2 puffs (180 mcg) by inhalation route every 6 hours as needed for 90 days   Vitamin D3 1,000 unit oral tablet  take 1 tablet by oral route 2 times a day   Zoloft 25 mg oral tablet 03/29/2021 take 1 tablet (25 mg) by oral route once daily for 30 days         Allergy List  Allergen Name Date Reaction Notes   iodine --  --  --        Allergies Reconciled  Family Medical History  Disease Name Relative/Age Notes   Cancer, Unspecified  Father/  Mother/   Mother; Father   Lung cancer Father/  Mother/   --    Prostate cancer  Uncle (maternal)   Family history of breast cancer  Aunt/40s         Reproductive History  Menstrual   Pregnancy Summary   Total Pregnancies: 2 Full Term: 2 Premature: 0   Ab Induced: 0 Ab Spontaneous: 0 Ectopics: 0   Multiples: 0 Livin         Social History  Finding Status Start/Stop Quantity Notes   Alcohol Current some day --/-- rarely 2020 -    Alcohol Use Current some day --/-- --  occasionally drinks   lives alone --  --/-- --  --    lives with other --  --/-- --  --    Recreational Drug Use Never --/-- --  no   Retired. --  --/-- --  --    Tobacco Former  1.5ppd former smoker  1  ppd x 40 years    --  --/-- --  --          Immunizations  NameDate Admin Mfg Trade Name Lot Number Route Inj VIS Given VIS Publication   [id number]/2020 [facility] Fluzone Quadrivalent [id number] IM RD 10/07/2020 08/15/2019   Comments: ndc [id number]   Prevnar [id number] [facility] PREVNAR 13 [id number] IM RD 2017   Comments: tolerated well         Review of Systems  · Constitutional  o Denies  o : fever, chills  · Eyes  o Denies  o : discharge from eye, changes in vision  · HENT  o Admits  o : vertigo  o Denies  o : ear pain, sore throat  · Cardiovascular  o Denies  o : chest Pain  · Respiratory  o Denies  o : frequent cough, shortness of breath  · Gastrointestinal  o Denies  o : nausea, vomiting, changes in bowel habits  · Genitourinary  o Admits  o : hot flashes  o Denies  o : dysuria  · Neurologic  o Admits  o : headache, dizziness  o Denies  o : tingling or numbness  · Musculoskeletal  o Admits  o : back pain, muscle weakness  · Psychiatric  o Denies  o : mood changes, memory changes, SI/HI  · Heme-Lymph  o Denies  o : easy bruising, easy bleeding, lymph node enlargement or tenderness  · Allergic-Immunologic  o Denies  o : eczema, seasonal allergies, urticaria      Vitals  Date Time BP Position Site L\R Cuff  "Size HR RR TEMP (F) WT  HT  BMI kg/m2 BSA m2 O2 Sat FR L/min FiO2 HC       03/09/2021 11:20 AM        97.1 214lbs 5oz 5'  6\" 34.59 2.13       03/17/2021 03:17 /71 Sitting    60 - R   217lbs 4oz 5'  6\" 35.06 2.14       05/19/2021 01:51 /56 Sitting    51 - R  98.1 20lbs 6oz 5'  6\" 3.29 0.66 96 %  21%          Physical Examination  · Constitutional  o Appearance  o : well-nourished, in no acute distress  · Eyes  o Conjunctivae  o : conjunctivae normal  o Sclerae  o : sclerae white  o Pupils and Irises  o : pupils equal and round  o Eyelids/Ocular Adnexae  o : eyelid appearance normal, no exudates present  · Ears, Nose, Mouth and Throat  o Ears  o :   § External Ears  § : external auditory canal appearance within normal limits, no discharge present  § Otoscopic Examination  § : tympanic membrane appearance within normal limits bilaterally, cerumen not present  o Nose  o :   § External Nose  § : appearance normal  § Intranasal Exam  § : mucosa within normal limits, vestibules normal, no intranasal lesions present, septum midline, sinuses non tender to palpation  § Nasopharynx  § : no discharge present  o Oral Cavity  o :   § Oral Mucosa  § : oral mucosa normal  § Lips  § : lip appearance normal  o Throat  o :   § Oropharynx  § : no inflammation or lesions present, tonsils within normal limits  · Neck  o Inspection/Palpation  o : normal appearance, no masses or tenderness, trachea midline  o Range of Motion  o : cervical range of motion within normal limits  o Thyroid  o : gland size normal, nontender, no nodules or masses present on palpation  · Respiratory  o Respiratory Effort  o : breathing unlabored  o Inspection of Chest  o : normal appearance  o Auscultation of Lungs  o : normal breath sounds throughout inspiration and expiration  · Cardiovascular  o Heart  o :   § Auscultation of Heart  § : regular rate and rhythm, no murmurs  o Peripheral Vascular System  o :   § Carotid Arteries  § : no bruits " present  § Extremities  § : no clubbing or edema  · Gastrointestinal  o Abdominal Examination  o : abdomen nontender to palpation, bowel sounds present   · Lymphatic  o Neck  o : no lymphadenopathy present  · Musculoskeletal  o Right Upper Extremity  o :   § Inspection/Palpation  § : no tenderness to palpation, ROM normal  o Left Upper Extremity  o :   § Inspection/Palpation  § : no tenderness to palpation, ROM normal  · Skin and Subcutaneous Tissue  o General Inspection  o : lipoma top of right shoulder   · Neurologic  o Mental Status Examination  o :   § Orientation  § : grossly oriented   o Gait and Station  o : normal gait, able to stand without difficulty  · Psychiatric  o Judgement and Insight  o : judgment and insight intact  o Mood and Affect  o : mood normal, affect appropriate  · Back  o Inspection  o : no gross deformities  o Palpation  o : lumbar spinal tenderness to palpation, no swelling  o Range of Motion  o : normal range of motion  o Reflexes  o : normal reflexes  o Gait  o : normal gait  o Special Tests  o : negative straight leg raise              Assessment  · Post menopausal syndrome     V49.81/Z78.0  · Visit for screening mammogram     V76.12/Z12.31  · COPD (chronic obstructive pulmonary disease)     496/J44.9  stable at this time   · Type 2 diabetes mellitus with other specified complication, without long-term current use of insulin     250.80/E11.69  will obtain hgb a1c   · Essential hypertension     401.9/I10  currently controlled   · Hyperlipidemia     272.4/E78.5  will obtain lipid panel   · Lumbago     724.2/M54.5  will refer to physical   · Other long term (current) drug therapy     V58.69/Z79.899  · Thoracic back pain     724.1/M54.6  · Dizziness     780.4/R42  will obtain MRI brain   · Weakness     780.79/R53.1  · Lipoma of right upper extremity     214.8/D17.21      Plan  · Orders  o DEXA Bone Density, 1 or more sites, axial skeleton [facility] (62609) - V49.81/Z78.0 -  05/19/2021  o Screening Mammography; Bilateral 3D (99935, , 46845) - V76.12/Z12.31 - 05/19/2021  o ACO-41: Dilated Diabetic eye exam completed this year and results in chart/reviewed (2022F) - - 05/19/2021  o Thoracic Spine (3 view) [facility] ([id number]) - 724.1/M54.6 - 05/19/2021  o GALINDO Report (KASPR) - - 05/19/2021  o ACO-19: Colorectal cancer screening results documented and reviewed (3017F) - - 05/19/2021  o ACO-39: Current medications updated and reviewed (, 1159F) - - 05/19/2021  o PAIN MANAGEMENT CONSULTATION (PAINM) - 724.2/M54.5 - 05/19/2021  o MRI brain w/w/o contrast (69655) - 780.4/R42, 780.79/R53.1 - 05/19/2021  o General Surgery Consult (GNSUR) - 214.8/D17.21 - 05/19/2021  o IOP - Urine Drug Screen In-House [facility] (13187) - V58.69/Z79.899 - 05/19/2021  · Medications  o tramadol 50 mg oral tablet   SIG: take 1 tablet (50 mg) by oral route every 6 hours as needed   DISP: (120) Tablet with 0 refills  Adjusted on 05/24/2021     o Claritin 10 mg oral tablet   SIG: take 1 tablet (10 mg) by oral route once daily for 90 days   DISP: (90) Tablet with 1 refills  Refilled on 05/19/2021     o ferrous sulfate 325 mg (65 mg iron) oral tablet   SIG: take 1 tablet (325 mg) by oral route once daily for 90 days   DISP: (90) Tablet with 0 refills  Refilled on 05/19/2021     o losartan 25 mg oral tablet   SIG: TAKE 1 TABLET BY MOUTH ONCE DAILY FOR 90 DAYS for 90 days   DISP: (90) Tablet with 1 refills  Refilled on 05/19/2021     o metformin 500 mg oral tablet extended release 24 hr   SIG: TAKE 1 TABLET BY MOUTH ONCE DAILY WITH EVENING MEAL FOR 90 DAYS for 90 days   DISP: (90) Tablet with 1 refills  Refilled on 05/19/2021     o metoprolol tartrate 100 mg oral tablet   SIG: TAKE 1 TABLET BY MOUTH TWICE DAILY FOR 90 DAYS for 90 days   DISP: (180) Tablet with 1 refills  Refilled on 05/19/2021     o omeprazole 40 mg oral capsule,delayed release(DR/EC)   SIG: TAKE 1 CAPSULE BY MOUTH ONCE DAILY BEFORE A MEAL FOR 90 DAYS   DISP:  (90) Capsule with 1 refills  Refilled on 05/19/2021     o rosuvastatin 20 mg oral tablet   SIG: TAKE 1 TABLET BY MOUTH ONCE DAILY FOR 90 DAYS for 90 days   DISP: (90) Tablet with 1 refills  Refilled on 05/19/2021     o spironolactone 25 mg oral tablet   SIG: TAKE 1 TABLET BY MOUTH TWICE DAILY FOR 90 DAYS for 90 days   DISP: (90) Tablet with 1 refills  Refilled on 05/19/2021     · Instructions  o Stop taking calcium supplements for at least 48 hours prior to your exam. Failure to stop supplements could alter results, and the radiologists will require you to reschedule your test.  o Obtained a written consent for GALINDO query. Discussed the risk and benefits of the use of controlled substances with the patient, including the risk of tolerance and drug dependence. The patient has been counseled on the need to have an exit strategy, including potentially discontinuing the use of controlled substances. GALINDO has or will be reviewed as soon as it becomes avaliable.  o Patient was educated/instructed on their diagnosis, treatment and medications prior to discharge from the clinic today.  · Disposition  o will contact with diagnostics results  o FOLLOW UP PENDING RESULTS            Electronically Signed by: BRIAN Pierson -Author on May 24, 2021 01:04:25 PM

## 2021-06-08 ENCOUNTER — TELEPHONE (OUTPATIENT)
Dept: FAMILY MEDICINE CLINIC | Facility: CLINIC | Age: 71
End: 2021-06-08

## 2021-06-08 NOTE — TELEPHONE ENCOUNTER
Caller: CHAKA    Relationship: [facility]     Best call back number: [phone]    What orders are you requesting (i.e. lab or imaging): MRI OF THE BRAIN WITH AND WITHOUT CONTRAST     In what timeframe would the patient need to come in: ASAP    Where will you receive your lab/imaging services: [facility]     Additional notes: FAX #: [phone]

## 2021-06-08 NOTE — TELEPHONE ENCOUNTER
Left [facility] for Bono to call the office, lack of information from the msg.

## 2021-06-08 NOTE — TELEPHONE ENCOUNTER
Caller: [facility]    Relationship: CLINICAL    Best call back number: [phone]    What orders are you requesting (i.e. lab or imaging): BRAIN MRI WITH AND WITHOUT CONTRAST    In what timeframe would the patient need to come in: ASAP    Where will you receive your lab/imaging services: [facility]

## 2021-07-15 VITALS
SYSTOLIC BLOOD PRESSURE: 121 MMHG | HEIGHT: 66 IN | WEIGHT: 20.38 LBS | TEMPERATURE: 98.1 F | DIASTOLIC BLOOD PRESSURE: 56 MMHG | BODY MASS INDEX: 3.27 KG/M2 | OXYGEN SATURATION: 96 % | HEART RATE: 51 BPM

## 2022-04-22 RX ORDER — LOSARTAN POTASSIUM 25 MG/1
TABLET ORAL
Qty: 90 TABLET | Refills: 0 | Status: SHIPPED | OUTPATIENT
Start: 2022-04-22

## 2023-08-04 ENCOUNTER — TELEPHONE (OUTPATIENT)
Dept: FAMILY MEDICINE CLINIC | Facility: CLINIC | Age: 73
End: 2023-08-04
Payer: MEDICARE